# Patient Record
Sex: MALE | Race: WHITE | NOT HISPANIC OR LATINO
[De-identification: names, ages, dates, MRNs, and addresses within clinical notes are randomized per-mention and may not be internally consistent; named-entity substitution may affect disease eponyms.]

---

## 2018-01-17 ENCOUNTER — TRANSCRIPTION ENCOUNTER (OUTPATIENT)
Age: 78
End: 2018-01-17

## 2018-01-17 ENCOUNTER — APPOINTMENT (OUTPATIENT)
Dept: DERMATOLOGY | Facility: CLINIC | Age: 78
End: 2018-01-17
Payer: MEDICARE

## 2018-01-17 VITALS — BODY MASS INDEX: 28.1 KG/M2 | WEIGHT: 212 LBS | HEIGHT: 73 IN

## 2018-01-17 DIAGNOSIS — H91.90 UNSPECIFIED HEARING LOSS, UNSPECIFIED EAR: ICD-10-CM

## 2018-01-17 PROCEDURE — 99213 OFFICE O/P EST LOW 20 MIN: CPT

## 2018-11-15 ENCOUNTER — RX RENEWAL (OUTPATIENT)
Age: 78
End: 2018-11-15

## 2018-11-15 DIAGNOSIS — B35.6 TINEA CRURIS: ICD-10-CM

## 2019-01-16 ENCOUNTER — APPOINTMENT (OUTPATIENT)
Dept: DERMATOLOGY | Facility: CLINIC | Age: 79
End: 2019-01-16
Payer: MEDICARE

## 2019-01-16 DIAGNOSIS — Z00.00 ENCOUNTER FOR GENERAL ADULT MEDICAL EXAMINATION W/OUT ABNORMAL FINDINGS: ICD-10-CM

## 2019-01-16 DIAGNOSIS — L43.9 LICHEN PLANUS, UNSPECIFIED: ICD-10-CM

## 2019-01-16 PROCEDURE — 99213 OFFICE O/P EST LOW 20 MIN: CPT

## 2019-01-16 RX ORDER — FLUOCINONIDE 0.5 MG/G
0.05 CREAM TOPICAL TWICE DAILY
Qty: 30 | Refills: 2 | Status: DISCONTINUED | COMMUNITY
Start: 2017-08-14 | End: 2019-01-16

## 2019-01-16 RX ORDER — KETOCONAZOLE 20 MG/G
2 CREAM TOPICAL TWICE DAILY
Qty: 1 | Refills: 1 | Status: DISCONTINUED | COMMUNITY
Start: 2018-11-15 | End: 2019-01-16

## 2019-01-16 NOTE — HISTORY OF PRESENT ILLNESS
[FreeTextEntry1] : Patient presents for skin examination. [de-identified] : Denies new, changing, bleeding or tender lesions on the skin over the past year.\par

## 2019-01-16 NOTE — PHYSICAL EXAM
[Alert] : alert [Oriented x 3] : ~L oriented x 3 [Well Nourished] : well nourished [Full Body Skin Exam Performed] : performed [Eyelids] : Eyelids [Ears] : Ears [Lips] : Lips [Neck] : Neck [Nails] : Nails [FreeTextEntry3] : A full skin exam was performed including the scalp, face, neck, chest, abdomen, back, buttocks, upper extremities and lower extremities.  The patient declined examination of the genitalia.  \par The exam did not reveal any evidence of skin cancer, showing only the following benign growths:\par Seborrheic keratoses.\par \par Violaceous papules and plaques of the left > right shin.

## 2019-01-16 NOTE — ASSESSMENT
[FreeTextEntry1] : A complete skin examination was performed.  There is no evidence of skin cancer.  We discussed the importance of photoprotection, including the use of hats, protective clothing and sunscreens with an SPF of at least 30.  Sun avoidance was also discussed.\par \par LP\par Continue lidex cream bid.\par AmLactin lotion daily.\par Discussed with patient and wife.

## 2020-01-22 ENCOUNTER — APPOINTMENT (OUTPATIENT)
Dept: DERMATOLOGY | Facility: CLINIC | Age: 80
End: 2020-01-22
Payer: MEDICARE

## 2020-01-22 VITALS — BODY MASS INDEX: 26.69 KG/M2 | WEIGHT: 208 LBS | HEIGHT: 74 IN

## 2020-01-22 DIAGNOSIS — L28.0 LICHEN SIMPLEX CHRONICUS: ICD-10-CM

## 2020-01-22 PROCEDURE — 99213 OFFICE O/P EST LOW 20 MIN: CPT

## 2020-01-22 NOTE — PHYSICAL EXAM
[Alert] : alert [Oriented x 3] : ~L oriented x 3 [Well Nourished] : well nourished [Full Body Skin Exam Performed] : performed [FreeTextEntry3] : A full skin exam was performed including the scalp, face (including lips, ears, nose and eyes), neck, chest, abdomen, back, buttocks, upper extremities and lower extremities.  The patient declined examination of the genitalia.  \par The exam revealed the following benign growths:\par Seborrheic keratoses.\par \par Lichenified patch, left shin, with minimal crusting.\par

## 2020-01-22 NOTE — HISTORY OF PRESENT ILLNESS
[FreeTextEntry1] : Patient presents for skin examination. [de-identified] : Notes itching patch of the left shin.  No tenderness, but occasionally scratching to bleeding.  Present for months.

## 2020-01-22 NOTE — ASSESSMENT
[FreeTextEntry1] : A complete skin examination was performed.  There is no evidence of skin cancer.  We discussed the importance of photoprotection, including the use of hats, protective clothing and sunscreens with an SPF of at least 30.  Sun avoidance was also discussed.  The ABCDE's of melanoma was discussed.  Regular skin exams recommended.\par \par LSC - education.\par Elocon cream bid.\par AmLactin lotion.

## 2020-12-09 RX ORDER — FLUOCINONIDE 0.5 MG/G
0.05 CREAM TOPICAL TWICE DAILY
Qty: 1 | Refills: 3 | Status: ACTIVE | COMMUNITY
Start: 2019-01-16 | End: 1900-01-01

## 2021-03-11 ENCOUNTER — EMERGENCY (EMERGENCY)
Facility: HOSPITAL | Age: 81
LOS: 0 days | Discharge: HOME | End: 2021-03-11
Attending: EMERGENCY MEDICINE
Payer: MEDICARE

## 2021-03-11 VITALS
SYSTOLIC BLOOD PRESSURE: 171 MMHG | RESPIRATION RATE: 16 BRPM | TEMPERATURE: 98 F | HEART RATE: 100 BPM | DIASTOLIC BLOOD PRESSURE: 86 MMHG | OXYGEN SATURATION: 95 %

## 2021-03-11 VITALS — WEIGHT: 214.95 LBS | HEIGHT: 73 IN

## 2021-03-11 DIAGNOSIS — I10 ESSENTIAL (PRIMARY) HYPERTENSION: ICD-10-CM

## 2021-03-11 DIAGNOSIS — N20.1 CALCULUS OF URETER: ICD-10-CM

## 2021-03-11 DIAGNOSIS — Z87.442 PERSONAL HISTORY OF URINARY CALCULI: ICD-10-CM

## 2021-03-11 DIAGNOSIS — R10.9 UNSPECIFIED ABDOMINAL PAIN: ICD-10-CM

## 2021-03-11 DIAGNOSIS — Z20.822 CONTACT WITH AND (SUSPECTED) EXPOSURE TO COVID-19: ICD-10-CM

## 2021-03-11 DIAGNOSIS — H91.90 UNSPECIFIED HEARING LOSS, UNSPECIFIED EAR: ICD-10-CM

## 2021-03-11 LAB
ALBUMIN SERPL ELPH-MCNC: 4.1 G/DL — SIGNIFICANT CHANGE UP (ref 3.3–5)
ALP SERPL-CCNC: 63 U/L — SIGNIFICANT CHANGE UP (ref 40–120)
ALT FLD-CCNC: 49 U/L — SIGNIFICANT CHANGE UP (ref 12–78)
ANION GAP SERPL CALC-SCNC: 8 MMOL/L — SIGNIFICANT CHANGE UP (ref 5–17)
APPEARANCE UR: ABNORMAL
APTT BLD: 30.7 SEC — SIGNIFICANT CHANGE UP (ref 27.5–35.5)
AST SERPL-CCNC: 30 U/L — SIGNIFICANT CHANGE UP (ref 15–37)
BASOPHILS # BLD AUTO: 0.05 K/UL — SIGNIFICANT CHANGE UP (ref 0–0.2)
BASOPHILS NFR BLD AUTO: 0.3 % — SIGNIFICANT CHANGE UP (ref 0–2)
BILIRUB SERPL-MCNC: 0.7 MG/DL — SIGNIFICANT CHANGE UP (ref 0.2–1.2)
BILIRUB UR-MCNC: NEGATIVE — SIGNIFICANT CHANGE UP
BUN SERPL-MCNC: 13 MG/DL — SIGNIFICANT CHANGE UP (ref 7–23)
CALCIUM SERPL-MCNC: 9.4 MG/DL — SIGNIFICANT CHANGE UP (ref 8.5–10.1)
CHLORIDE SERPL-SCNC: 102 MMOL/L — SIGNIFICANT CHANGE UP (ref 96–108)
CO2 SERPL-SCNC: 26 MMOL/L — SIGNIFICANT CHANGE UP (ref 22–31)
COLOR SPEC: YELLOW — SIGNIFICANT CHANGE UP
CREAT SERPL-MCNC: 1.35 MG/DL — HIGH (ref 0.5–1.3)
DIFF PNL FLD: ABNORMAL
EOSINOPHIL # BLD AUTO: 0.04 K/UL — SIGNIFICANT CHANGE UP (ref 0–0.5)
EOSINOPHIL NFR BLD AUTO: 0.3 % — SIGNIFICANT CHANGE UP (ref 0–6)
GLUCOSE SERPL-MCNC: 230 MG/DL — HIGH (ref 70–99)
GLUCOSE UR QL: 50 MG/DL
HCT VFR BLD CALC: 49 % — SIGNIFICANT CHANGE UP (ref 39–50)
HGB BLD-MCNC: 17.2 G/DL — HIGH (ref 13–17)
IMM GRANULOCYTES NFR BLD AUTO: 0.3 % — SIGNIFICANT CHANGE UP (ref 0–1.5)
INR BLD: 1.01 RATIO — SIGNIFICANT CHANGE UP (ref 0.88–1.16)
KETONES UR-MCNC: ABNORMAL
LEUKOCYTE ESTERASE UR-ACNC: NEGATIVE — SIGNIFICANT CHANGE UP
LIDOCAIN IGE QN: 59 U/L — LOW (ref 73–393)
LYMPHOCYTES # BLD AUTO: 1.38 K/UL — SIGNIFICANT CHANGE UP (ref 1–3.3)
LYMPHOCYTES # BLD AUTO: 9.3 % — LOW (ref 13–44)
MCHC RBC-ENTMCNC: 30.8 PG — SIGNIFICANT CHANGE UP (ref 27–34)
MCHC RBC-ENTMCNC: 35.1 GM/DL — SIGNIFICANT CHANGE UP (ref 32–36)
MCV RBC AUTO: 87.8 FL — SIGNIFICANT CHANGE UP (ref 80–100)
MONOCYTES # BLD AUTO: 0.89 K/UL — SIGNIFICANT CHANGE UP (ref 0–0.9)
MONOCYTES NFR BLD AUTO: 6 % — SIGNIFICANT CHANGE UP (ref 2–14)
NEUTROPHILS # BLD AUTO: 12.47 K/UL — HIGH (ref 1.8–7.4)
NEUTROPHILS NFR BLD AUTO: 83.8 % — HIGH (ref 43–77)
NITRITE UR-MCNC: NEGATIVE — SIGNIFICANT CHANGE UP
PH UR: 7 — SIGNIFICANT CHANGE UP (ref 5–8)
PLATELET # BLD AUTO: 215 K/UL — SIGNIFICANT CHANGE UP (ref 150–400)
POTASSIUM SERPL-MCNC: 4.2 MMOL/L — SIGNIFICANT CHANGE UP (ref 3.5–5.3)
POTASSIUM SERPL-SCNC: 4.2 MMOL/L — SIGNIFICANT CHANGE UP (ref 3.5–5.3)
PROT SERPL-MCNC: 7.6 GM/DL — SIGNIFICANT CHANGE UP (ref 6–8.3)
PROT UR-MCNC: 100 MG/DL
PROTHROM AB SERPL-ACNC: 11.8 SEC — SIGNIFICANT CHANGE UP (ref 10.6–13.6)
RBC # BLD: 5.58 M/UL — SIGNIFICANT CHANGE UP (ref 4.2–5.8)
RBC # FLD: 12.4 % — SIGNIFICANT CHANGE UP (ref 10.3–14.5)
SARS-COV-2 RNA SPEC QL NAA+PROBE: SIGNIFICANT CHANGE UP
SODIUM SERPL-SCNC: 136 MMOL/L — SIGNIFICANT CHANGE UP (ref 135–145)
SP GR SPEC: 1.01 — SIGNIFICANT CHANGE UP (ref 1.01–1.02)
UROBILINOGEN FLD QL: NEGATIVE MG/DL — SIGNIFICANT CHANGE UP
WBC # BLD: 14.88 K/UL — HIGH (ref 3.8–10.5)
WBC # FLD AUTO: 14.88 K/UL — HIGH (ref 3.8–10.5)

## 2021-03-11 PROCEDURE — 74176 CT ABD & PELVIS W/O CONTRAST: CPT | Mod: 26

## 2021-03-11 PROCEDURE — 85730 THROMBOPLASTIN TIME PARTIAL: CPT

## 2021-03-11 PROCEDURE — 96374 THER/PROPH/DIAG INJ IV PUSH: CPT

## 2021-03-11 PROCEDURE — 99284 EMERGENCY DEPT VISIT MOD MDM: CPT | Mod: 25

## 2021-03-11 PROCEDURE — U0005: CPT

## 2021-03-11 PROCEDURE — 96375 TX/PRO/DX INJ NEW DRUG ADDON: CPT

## 2021-03-11 PROCEDURE — 86900 BLOOD TYPING SEROLOGIC ABO: CPT

## 2021-03-11 PROCEDURE — 93005 ELECTROCARDIOGRAM TRACING: CPT

## 2021-03-11 PROCEDURE — 74176 CT ABD & PELVIS W/O CONTRAST: CPT

## 2021-03-11 PROCEDURE — 85025 COMPLETE CBC W/AUTO DIFF WBC: CPT

## 2021-03-11 PROCEDURE — 83690 ASSAY OF LIPASE: CPT

## 2021-03-11 PROCEDURE — U0003: CPT

## 2021-03-11 PROCEDURE — 81001 URINALYSIS AUTO W/SCOPE: CPT

## 2021-03-11 PROCEDURE — 86901 BLOOD TYPING SEROLOGIC RH(D): CPT

## 2021-03-11 PROCEDURE — 71045 X-RAY EXAM CHEST 1 VIEW: CPT | Mod: 26

## 2021-03-11 PROCEDURE — 93010 ELECTROCARDIOGRAM REPORT: CPT

## 2021-03-11 PROCEDURE — 71045 X-RAY EXAM CHEST 1 VIEW: CPT

## 2021-03-11 PROCEDURE — 86850 RBC ANTIBODY SCREEN: CPT

## 2021-03-11 PROCEDURE — 36415 COLL VENOUS BLD VENIPUNCTURE: CPT

## 2021-03-11 PROCEDURE — 85610 PROTHROMBIN TIME: CPT

## 2021-03-11 PROCEDURE — 99283 EMERGENCY DEPT VISIT LOW MDM: CPT

## 2021-03-11 PROCEDURE — 80053 COMPREHEN METABOLIC PANEL: CPT

## 2021-03-11 RX ORDER — TAMSULOSIN HYDROCHLORIDE 0.4 MG/1
1 CAPSULE ORAL
Qty: 14 | Refills: 0
Start: 2021-03-11 | End: 2021-03-24

## 2021-03-11 RX ORDER — KETOROLAC TROMETHAMINE 30 MG/ML
30 SYRINGE (ML) INJECTION ONCE
Refills: 0 | Status: DISCONTINUED | OUTPATIENT
Start: 2021-03-11 | End: 2021-03-11

## 2021-03-11 RX ORDER — SODIUM CHLORIDE 9 MG/ML
500 INJECTION INTRAMUSCULAR; INTRAVENOUS; SUBCUTANEOUS ONCE
Refills: 0 | Status: COMPLETED | OUTPATIENT
Start: 2021-03-11 | End: 2021-03-11

## 2021-03-11 RX ORDER — ONDANSETRON 8 MG/1
4 TABLET, FILM COATED ORAL ONCE
Refills: 0 | Status: COMPLETED | OUTPATIENT
Start: 2021-03-11 | End: 2021-03-11

## 2021-03-11 RX ORDER — MORPHINE SULFATE 50 MG/1
4 CAPSULE, EXTENDED RELEASE ORAL ONCE
Refills: 0 | Status: DISCONTINUED | OUTPATIENT
Start: 2021-03-11 | End: 2021-03-11

## 2021-03-11 RX ORDER — OXYCODONE HYDROCHLORIDE 5 MG/1
1 TABLET ORAL
Qty: 12 | Refills: 0
Start: 2021-03-11 | End: 2021-03-13

## 2021-03-11 RX ADMIN — SODIUM CHLORIDE 500 MILLILITER(S): 9 INJECTION INTRAMUSCULAR; INTRAVENOUS; SUBCUTANEOUS at 03:52

## 2021-03-11 RX ADMIN — Medication 30 MILLIGRAM(S): at 06:12

## 2021-03-11 RX ADMIN — SODIUM CHLORIDE 500 MILLILITER(S): 9 INJECTION INTRAMUSCULAR; INTRAVENOUS; SUBCUTANEOUS at 02:18

## 2021-03-11 RX ADMIN — MORPHINE SULFATE 4 MILLIGRAM(S): 50 CAPSULE, EXTENDED RELEASE ORAL at 02:18

## 2021-03-11 NOTE — ED PROVIDER NOTE - PATIENT PORTAL LINK FT
You can access the FollowMyHealth Patient Portal offered by [facility] by registering at the following website: [email or web address]. By joining [employer]’s FollowMyHealth portal, you will also be able to view your health information using other applications (apps) compatible with our system.

## 2021-03-11 NOTE — ED PROVIDER NOTE - NSFOLLOWUPINSTRUCTIONS_ED_ALL_ED_FT
return to er for fever, chills, worsening pain or inability to urinate  call Dr. Donovan office today for appointment  stay very well hydrated drink 6, 8 ounce glasses of water

## 2021-03-11 NOTE — ED PROVIDER NOTE - PHYSICAL EXAMINATION
Gen:  Well appearing in moderate distress  Head:  NC/AT  Cardiac: S1S2, RRR  Abd: Soft, non tender left cvat  Resp: No distress, CTA   Ext: no deformities  Skin: warm and dry as visualized

## 2021-03-11 NOTE — ED PROVIDER NOTE - PROGRESS NOTE DETAILS
pt feeling better, updated pt and spoke to wife on the phone [phone] cell [phone]. will call on call urology Dr. Callahan for obstructive stone  wife states last time he had kidney stones was 1980 B Senait FRANK spoke with urologist Dr. Hieu Callahan, updated pt wife. pt may go home and follow up in the office GÓMEZ Sapp DO

## 2021-03-11 NOTE — ED ADULT NURSE NOTE - OBJECTIVE STATEMENT
pt, hard of hearing with hx of kidney stones presents to the ED c/o L sided flank pain/back pain that started the day before. States pain radiated  Pt c/o 10/10 pain. Pt denies chest pain, SOB, dizziness, h/a, N/V/urinary difficulty. Wrote on piece of paper to communicate with pt. PIV inserted and medications given as per doctor order. All safety measures in place at this time.

## 2021-03-11 NOTE — ED ADULT TRIAGE NOTE - CHIEF COMPLAINT QUOTE
Pt presents to the ED with left sided flank pain beginning around 1930 last night. Denies urinary symptoms, hematuria. Endorses nausea, denies V/D. Endorses history of kidney stones. 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

## 2021-03-11 NOTE — ED PROVIDER NOTE - CARE PROVIDER_API CALL
Hieu Callahan)  Urology  [address], [address] [place] [address]  Phone: [phone]  Fax: [phone]  Follow Up Time:

## 2021-03-11 NOTE — ED PROVIDER NOTE - CLINICAL SUMMARY MEDICAL DECISION MAKING FREE TEXT BOX
pt with left flank pain with ho kidney stones, will get labs, ua, ivf, pain medication and ct r/o obstructive uropathy

## 2021-03-11 NOTE — ED PROVIDER NOTE - OBJECTIVE STATEMENT
79 yo male with ho kidney stones and htn (always high when I come to the hospital) and 90% hearing loss pw left flank pain, no fever, chills, n/v/d.

## 2021-03-12 PROBLEM — H91.90 UNSPECIFIED HEARING LOSS, UNSPECIFIED EAR: Chronic | Status: ACTIVE | Noted: 2021-03-11

## 2021-03-12 PROBLEM — I10 ESSENTIAL (PRIMARY) HYPERTENSION: Chronic | Status: ACTIVE | Noted: 2021-03-11

## 2021-03-12 PROBLEM — N20.0 CALCULUS OF KIDNEY: Chronic | Status: ACTIVE | Noted: 2021-03-11

## 2021-03-30 ENCOUNTER — APPOINTMENT (OUTPATIENT)
Dept: UROLOGY | Facility: CLINIC | Age: 81
End: 2021-03-30
Payer: MEDICARE

## 2021-03-30 VITALS
HEART RATE: 98 BPM | TEMPERATURE: 97.4 F | SYSTOLIC BLOOD PRESSURE: 212 MMHG | WEIGHT: 210 LBS | BODY MASS INDEX: 27.83 KG/M2 | OXYGEN SATURATION: 98 % | DIASTOLIC BLOOD PRESSURE: 98 MMHG | HEIGHT: 73 IN

## 2021-03-30 DIAGNOSIS — N20.1 CALCULUS OF URETER: ICD-10-CM

## 2021-03-30 PROCEDURE — 99203 OFFICE O/P NEW LOW 30 MIN: CPT

## 2021-03-30 PROCEDURE — 99072 ADDL SUPL MATRL&STAF TM PHE: CPT

## 2021-03-30 NOTE — PHYSICAL EXAM
[General Appearance - Well Developed] : well developed [General Appearance - Well Nourished] : well nourished [Normal Appearance] : normal appearance [Well Groomed] : well groomed [General Appearance - In No Acute Distress] : no acute distress [Edema] : no peripheral edema [Exaggerated Use Of Accessory Muscles For Inspiration] : no accessory muscle use [Respiration, Rhythm And Depth] : normal respiratory rhythm and effort [Abdomen Soft] : soft [Abdomen Tenderness] : non-tender [Costovertebral Angle Tenderness] : no ~M costovertebral angle tenderness [Urethral Meatus] : meatus normal [Urinary Bladder Findings] : the bladder was normal on palpation [Scrotum] : the scrotum was normal [Normal Station and Gait] : the gait and station were normal for the patient's age [] : no rash [No Focal Deficits] : no focal deficits [Oriented To Time, Place, And Person] : oriented to person, place, and time [Affect] : the affect was normal [Mood] : the mood was normal [Not Anxious] : not anxious [No Palpable Adenopathy] : no palpable adenopathy

## 2021-03-30 NOTE — ASSESSMENT
[FreeTextEntry1] : 81 yo M with recent  ER visit for LEFT ureteral stone, now asymptomatic. Suspect he passed stone. Will obtain RBUS and KUB to confirm.

## 2021-03-30 NOTE — HISTORY OF PRESENT ILLNESS
[FreeTextEntry1] : 80 year old M seen 03/30/2021  with complaint of ureteral stone. This began 3/11/2021 when the patient presented to  with severe LEFT flank pain. CT showed 6 mm LEFT ureteral stone, and he was discharged after pain was controlled. Pt reports pain did not return. Voiding well. No other complaints at this time. \par Pain was severe in severity. Pain medication mad symptoms better, nothing makes sx worse. \par It is associated with nothing.\par No hematuria, no dysuria, no frequency, no urgency, no hesitancy, no straining. No incontinence. \par No fevers, no chills, no nausea, no vomiting, no current flank pain. \par \par No family history contributory to kidney stones.

## 2021-03-31 ENCOUNTER — APPOINTMENT (OUTPATIENT)
Dept: DERMATOLOGY | Facility: CLINIC | Age: 81
End: 2021-03-31

## 2021-05-03 ENCOUNTER — APPOINTMENT (OUTPATIENT)
Dept: DERMATOLOGY | Facility: CLINIC | Age: 81
End: 2021-05-03
Payer: MEDICARE

## 2021-05-03 DIAGNOSIS — D18.01 HEMANGIOMA OF SKIN AND SUBCUTANEOUS TISSUE: ICD-10-CM

## 2021-05-03 DIAGNOSIS — L82.0 INFLAMED SEBORRHEIC KERATOSIS: ICD-10-CM

## 2021-05-03 DIAGNOSIS — L82.1 OTHER SEBORRHEIC KERATOSIS: ICD-10-CM

## 2021-05-03 PROCEDURE — 99213 OFFICE O/P EST LOW 20 MIN: CPT | Mod: 25

## 2021-05-03 PROCEDURE — 99072 ADDL SUPL MATRL&STAF TM PHE: CPT

## 2021-05-03 PROCEDURE — 17110 DESTRUCTION B9 LES UP TO 14: CPT

## 2021-05-03 RX ORDER — OXYCODONE 5 MG/1
5 TABLET ORAL
Qty: 12 | Refills: 0 | Status: COMPLETED | COMMUNITY
Start: 2021-03-11 | End: 2021-05-03

## 2021-05-03 RX ORDER — TAMSULOSIN HYDROCHLORIDE 0.4 MG/1
0.4 CAPSULE ORAL
Qty: 14 | Refills: 0 | Status: COMPLETED | COMMUNITY
Start: 2021-03-11 | End: 2021-05-03

## 2021-05-03 RX ORDER — MOMETASONE FUROATE 1 MG/G
0.1 CREAM TOPICAL TWICE DAILY
Qty: 1 | Refills: 3 | Status: COMPLETED | COMMUNITY
Start: 2020-01-22 | End: 2021-05-03

## 2021-05-03 NOTE — HISTORY OF PRESENT ILLNESS
[FreeTextEntry1] : Patient presents for skin examination. [de-identified] : Notes irritated lesion of the left malar region.  Itching, but no bleeding.

## 2021-05-03 NOTE — PHYSICAL EXAM
[Alert] : alert [Oriented x 3] : ~L oriented x 3 [Well Nourished] : well nourished [Full Body Skin Exam Performed] : performed [FreeTextEntry3] : A full skin exam was performed including the scalp, face, neck, chest, abdomen, back, buttocks, upper extremities and lower extremities.  The patient declined examination of the breasts and genitalia.  \par The exam did show the following benign growths:\par Seborrheic keratoses.\par Cherry angioma.\par \par Greasy crusted papule, of the left malar region.\par \par

## 2022-03-15 ENCOUNTER — APPOINTMENT (OUTPATIENT)
Dept: UROLOGY | Facility: CLINIC | Age: 82
End: 2022-03-15

## 2022-07-05 ENCOUNTER — APPOINTMENT (OUTPATIENT)
Dept: DERMATOLOGY | Facility: CLINIC | Age: 82
End: 2022-07-05

## 2023-08-18 ENCOUNTER — NON-APPOINTMENT (OUTPATIENT)
Age: 83
End: 2023-08-18

## 2023-08-22 ENCOUNTER — INPATIENT (INPATIENT)
Facility: HOSPITAL | Age: 83
LOS: 1 days | Discharge: HOME | DRG: 603 | End: 2023-08-24
Attending: HOSPITALIST | Admitting: INTERNAL MEDICINE
Payer: MEDICARE

## 2023-08-22 VITALS
SYSTOLIC BLOOD PRESSURE: 231 MMHG | RESPIRATION RATE: 18 BRPM | OXYGEN SATURATION: 97 % | DIASTOLIC BLOOD PRESSURE: 113 MMHG | HEART RATE: 111 BPM | TEMPERATURE: 99 F

## 2023-08-22 DIAGNOSIS — L03.116 CELLULITIS OF LEFT LOWER LIMB: ICD-10-CM

## 2023-08-22 LAB
ALBUMIN SERPL ELPH-MCNC: 3.7 G/DL — SIGNIFICANT CHANGE UP (ref 3.3–5)
ALP SERPL-CCNC: 66 U/L — SIGNIFICANT CHANGE UP (ref 40–120)
ALT FLD-CCNC: 37 U/L — SIGNIFICANT CHANGE UP (ref 12–78)
ANION GAP SERPL CALC-SCNC: 4 MMOL/L — LOW (ref 5–17)
APPEARANCE UR: CLEAR — SIGNIFICANT CHANGE UP
APTT BLD: 31.2 SEC — SIGNIFICANT CHANGE UP (ref 24.5–35.6)
AST SERPL-CCNC: 21 U/L — SIGNIFICANT CHANGE UP (ref 15–37)
BASOPHILS # BLD AUTO: 0.03 K/UL — SIGNIFICANT CHANGE UP (ref 0–0.2)
BASOPHILS NFR BLD AUTO: 0.3 % — SIGNIFICANT CHANGE UP (ref 0–2)
BILIRUB SERPL-MCNC: 0.8 MG/DL — SIGNIFICANT CHANGE UP (ref 0.2–1.2)
BILIRUB UR-MCNC: NEGATIVE — SIGNIFICANT CHANGE UP
BUN SERPL-MCNC: 11 MG/DL — SIGNIFICANT CHANGE UP (ref 7–23)
CALCIUM SERPL-MCNC: 9.7 MG/DL — SIGNIFICANT CHANGE UP (ref 8.5–10.1)
CHLORIDE SERPL-SCNC: 104 MMOL/L — SIGNIFICANT CHANGE UP (ref 96–108)
CO2 SERPL-SCNC: 30 MMOL/L — SIGNIFICANT CHANGE UP (ref 22–31)
COLOR SPEC: YELLOW — SIGNIFICANT CHANGE UP
CREAT SERPL-MCNC: 0.88 MG/DL — SIGNIFICANT CHANGE UP (ref 0.5–1.3)
DIFF PNL FLD: NEGATIVE — SIGNIFICANT CHANGE UP
EGFR: 85 ML/MIN/1.73M2 — SIGNIFICANT CHANGE UP
EOSINOPHIL # BLD AUTO: 0.1 K/UL — SIGNIFICANT CHANGE UP (ref 0–0.5)
EOSINOPHIL NFR BLD AUTO: 1.1 % — SIGNIFICANT CHANGE UP (ref 0–6)
GLUCOSE SERPL-MCNC: 129 MG/DL — HIGH (ref 70–99)
GLUCOSE UR QL: NEGATIVE — SIGNIFICANT CHANGE UP
HCT VFR BLD CALC: 46.4 % — SIGNIFICANT CHANGE UP (ref 39–50)
HGB BLD-MCNC: 16.2 G/DL — SIGNIFICANT CHANGE UP (ref 13–17)
IMM GRANULOCYTES NFR BLD AUTO: 0.3 % — SIGNIFICANT CHANGE UP (ref 0–0.9)
INR BLD: 0.99 RATIO — SIGNIFICANT CHANGE UP (ref 0.85–1.18)
KETONES UR-MCNC: ABNORMAL
LACTATE SERPL-SCNC: 1.3 MMOL/L — SIGNIFICANT CHANGE UP (ref 0.7–2)
LEUKOCYTE ESTERASE UR-ACNC: NEGATIVE — SIGNIFICANT CHANGE UP
LYMPHOCYTES # BLD AUTO: 1.56 K/UL — SIGNIFICANT CHANGE UP (ref 1–3.3)
LYMPHOCYTES # BLD AUTO: 16.9 % — SIGNIFICANT CHANGE UP (ref 13–44)
MCHC RBC-ENTMCNC: 31.2 PG — SIGNIFICANT CHANGE UP (ref 27–34)
MCHC RBC-ENTMCNC: 34.9 GM/DL — SIGNIFICANT CHANGE UP (ref 32–36)
MCV RBC AUTO: 89.4 FL — SIGNIFICANT CHANGE UP (ref 80–100)
MONOCYTES # BLD AUTO: 0.81 K/UL — SIGNIFICANT CHANGE UP (ref 0–0.9)
MONOCYTES NFR BLD AUTO: 8.8 % — SIGNIFICANT CHANGE UP (ref 2–14)
NEUTROPHILS # BLD AUTO: 6.68 K/UL — SIGNIFICANT CHANGE UP (ref 1.8–7.4)
NEUTROPHILS NFR BLD AUTO: 72.6 % — SIGNIFICANT CHANGE UP (ref 43–77)
NITRITE UR-MCNC: NEGATIVE — SIGNIFICANT CHANGE UP
PH UR: 8 — SIGNIFICANT CHANGE UP (ref 5–8)
PLATELET # BLD AUTO: 276 K/UL — SIGNIFICANT CHANGE UP (ref 150–400)
POTASSIUM SERPL-MCNC: 4.1 MMOL/L — SIGNIFICANT CHANGE UP (ref 3.5–5.3)
POTASSIUM SERPL-SCNC: 4.1 MMOL/L — SIGNIFICANT CHANGE UP (ref 3.5–5.3)
PROT SERPL-MCNC: 8.2 GM/DL — SIGNIFICANT CHANGE UP (ref 6–8.3)
PROT UR-MCNC: NEGATIVE — SIGNIFICANT CHANGE UP
PROTHROM AB SERPL-ACNC: 11.2 SEC — SIGNIFICANT CHANGE UP (ref 9.5–13)
RBC # BLD: 5.19 M/UL — SIGNIFICANT CHANGE UP (ref 4.2–5.8)
RBC # FLD: 12.3 % — SIGNIFICANT CHANGE UP (ref 10.3–14.5)
SODIUM SERPL-SCNC: 138 MMOL/L — SIGNIFICANT CHANGE UP (ref 135–145)
SP GR SPEC: 1.01 — SIGNIFICANT CHANGE UP (ref 1.01–1.02)
UROBILINOGEN FLD QL: NEGATIVE — SIGNIFICANT CHANGE UP
WBC # BLD: 9.21 K/UL — SIGNIFICANT CHANGE UP (ref 3.8–10.5)
WBC # FLD AUTO: 9.21 K/UL — SIGNIFICANT CHANGE UP (ref 3.8–10.5)

## 2023-08-22 PROCEDURE — 73630 X-RAY EXAM OF FOOT: CPT | Mod: 26,LT

## 2023-08-22 PROCEDURE — 99285 EMERGENCY DEPT VISIT HI MDM: CPT

## 2023-08-22 PROCEDURE — 71045 X-RAY EXAM CHEST 1 VIEW: CPT | Mod: 26

## 2023-08-22 PROCEDURE — 87086 URINE CULTURE/COLONY COUNT: CPT

## 2023-08-22 PROCEDURE — 83036 HEMOGLOBIN GLYCOSYLATED A1C: CPT

## 2023-08-22 PROCEDURE — 93010 ELECTROCARDIOGRAM REPORT: CPT

## 2023-08-22 PROCEDURE — 80048 BASIC METABOLIC PNL TOTAL CA: CPT

## 2023-08-22 PROCEDURE — 85025 COMPLETE CBC W/AUTO DIFF WBC: CPT

## 2023-08-22 PROCEDURE — 80202 ASSAY OF VANCOMYCIN: CPT

## 2023-08-22 PROCEDURE — 81003 URINALYSIS AUTO W/O SCOPE: CPT

## 2023-08-22 PROCEDURE — 36415 COLL VENOUS BLD VENIPUNCTURE: CPT

## 2023-08-22 PROCEDURE — 80061 LIPID PANEL: CPT

## 2023-08-22 RX ORDER — OMEGA-3 ACID ETHYL ESTERS 1 G
1 CAPSULE ORAL
Refills: 0 | DISCHARGE

## 2023-08-22 RX ORDER — VANCOMYCIN HCL 1 G
1000 VIAL (EA) INTRAVENOUS ONCE
Refills: 0 | Status: DISCONTINUED | OUTPATIENT
Start: 2023-08-22 | End: 2023-08-22

## 2023-08-22 RX ORDER — CEFTRIAXONE 500 MG/1
1000 INJECTION, POWDER, FOR SOLUTION INTRAMUSCULAR; INTRAVENOUS ONCE
Refills: 0 | Status: DISCONTINUED | OUTPATIENT
Start: 2023-08-22 | End: 2023-08-22

## 2023-08-22 RX ORDER — ALPRAZOLAM 0.25 MG
0.25 TABLET ORAL ONCE
Refills: 0 | Status: DISCONTINUED | OUTPATIENT
Start: 2023-08-22 | End: 2023-08-22

## 2023-08-22 RX ORDER — MAGNESIUM OXIDE 400 MG ORAL TABLET 241.3 MG
1 TABLET ORAL
Refills: 0 | DISCHARGE

## 2023-08-22 RX ORDER — VANCOMYCIN HCL 1 G
1250 VIAL (EA) INTRAVENOUS ONCE
Refills: 0 | Status: COMPLETED | OUTPATIENT
Start: 2023-08-22 | End: 2023-08-22

## 2023-08-22 RX ORDER — SODIUM CHLORIDE 9 MG/ML
500 INJECTION INTRAMUSCULAR; INTRAVENOUS; SUBCUTANEOUS ONCE
Refills: 0 | Status: COMPLETED | OUTPATIENT
Start: 2023-08-22 | End: 2023-08-22

## 2023-08-22 RX ORDER — CEFTRIAXONE 500 MG/1
1000 INJECTION, POWDER, FOR SOLUTION INTRAMUSCULAR; INTRAVENOUS ONCE
Refills: 0 | Status: COMPLETED | OUTPATIENT
Start: 2023-08-22 | End: 2023-08-22

## 2023-08-22 RX ORDER — HYDRALAZINE HCL 50 MG
10 TABLET ORAL ONCE
Refills: 0 | Status: COMPLETED | OUTPATIENT
Start: 2023-08-22 | End: 2023-08-22

## 2023-08-22 RX ORDER — CHOLECALCIFEROL (VITAMIN D3) 125 MCG
2 CAPSULE ORAL
Refills: 0 | DISCHARGE

## 2023-08-22 RX ADMIN — CEFTRIAXONE 1000 MILLIGRAM(S): 500 INJECTION, POWDER, FOR SOLUTION INTRAMUSCULAR; INTRAVENOUS at 18:05

## 2023-08-22 RX ADMIN — Medication 10 MILLIGRAM(S): at 18:51

## 2023-08-22 RX ADMIN — SODIUM CHLORIDE 500 MILLILITER(S): 9 INJECTION INTRAMUSCULAR; INTRAVENOUS; SUBCUTANEOUS at 18:33

## 2023-08-22 RX ADMIN — Medication 0.25 MILLIGRAM(S): at 18:52

## 2023-08-22 RX ADMIN — Medication 166.67 MILLIGRAM(S): at 18:33

## 2023-08-22 NOTE — ED ADULT NURSE NOTE - NSFALLRISKINTERV_ED_ALL_ED
Assistance with ambulation/Communicate fall risk and risk factors to all staff, patient, and family/Provide visual cue: yellow wristband, yellow gown, etc/Reinforce activity limits and safety measures with patient and family/Call bell, personal items and telephone in reach/Instruct patient to call for assistance before getting out of bed/chair/stretcher/Non-slip footwear applied when patient is off stretcher/[place] to call system/Physically safe environment - no spills, clutter or unnecessary equipment/Purposeful Proactive Rounding/Room/bathroom lighting operational, light cord in reach

## 2023-08-22 NOTE — H&P ADULT - SKIN COMMENTS
+marked erythema dorsal L foot w some soft tissue swelling +marked erythema dorsal L foot w some soft tissue swelling, no streaking, skin intact

## 2023-08-22 NOTE — ED ADULT NURSE NOTE - OBJECTIVE STATEMENT
pt c/o swelling and redness to top of  left foot. Pt reports cutting shrubs last wednesday and the foot was swollen the next day. Pt reports getting abx at urgent care last thursday. Foot is swollen, with purple and red discoloration. Capillary refill less than 2 seconds. Skin warm to touch. Pt denies n/v/d/ chills, fever, pain.

## 2023-08-22 NOTE — H&P ADULT - NSHPLABSRESULTS_GEN_ALL_CORE
Xray L foot  no FB or gas        EKG NSR w IRBBB      CXR no acute disease, no significant change when I compared to CXR 2021

## 2023-08-22 NOTE — ED PROVIDER NOTE - CONSTITUTIONAL, MLM
Elderly WM. Well appearing, awake, alert, oriented to person, place, time/situation and in no apparent distress. normal... Elderly WM, awake, alert, oriented to person, place, time/situation and in no apparent distress.

## 2023-08-22 NOTE — ED STATDOCS - PROGRESS NOTE DETAILS
Lety Franklin for attending Dr. Lancaster:   82 y/o male w/PMHx of kidney stones, HTN, [place] presents to ED c/o worsening swelling to left foot. As per wife, pt was doing yard work with shrubs 5 days ago, noted redness on top of left foot with associated swelling and pain. Reports symptoms have been worsening. Was seen at urgent care 3 days ago and was started on Augmentin and was instructed to make a follow up appointment. Pt does not have PCP. Presents to ED for further evaluation of worsening symptoms. No fevers or chills. Pt with LLE cellulitis, questionable lymphangitis. Hypertensive to 200/100, tachycardic to 111, oral temp of 99F. Will send to main ED for further evaluation. Jeronimojolene Padilla PGY3: Agree with above rapid assessment. Additional Hx: Patient believes he has a possible insect bite on his foot 5 days ago while working in yard. No response to Augmentin from UC 3 days ago. Today with tachycardia, borderline temp, and localized cellulitis of RLE. tba for IV abx in setting out outpatient abx failure.

## 2023-08-22 NOTE — ED PROVIDER NOTE - OBJECTIVE STATEMENT
Pt is a 83y male w/ a PMH of anxiety, kidney stones, HTN, [place] presents to ED c/o worsening painful swelling to left foot. As per wife, pt was doing yard work with shrubs wearing flip flops on 8/16, noted redness on top of left foot with associated swelling and pain the day after.  Was seen at  8/19, was started on Augmentin w/ little relief of pain and ambulating, was instructed to make a f/u appointment. Wife told her PCP about 's condition, recommended ED visit. In triage, patient was hypertensive w/ 200/100, tachycardic w/ 111, oral temp of 99F. Denies fevers or chills. NKA. Pt is a 83y male w/ a PMH of anxiety, kidney stones, HTN, [place] presents ambulatory to ED c/o worsening painful swelling to left foot. As per wife, pt was doing yard work with shrubs wearing flip flops on 8/16, noted redness on top of left foot with associated swelling and pain the day after.  Was seen at  8/19, was started on po Augmentin w/ little relief of pain and ambulating, was instructed to make a f/u appointment. Wife told her PMD about 's condition, recommended ED eval. In triage, patient was hypertensive w/ 200/100, tachycardic w/ 111, oral temp of 99F. Denies fevers or chills. NKA. Pt is a 83y male w/ a PMH of anxiety, kidney stones, HTN, [place] presents ambulatory to ED c/o worsening painful swelling to left foot. As per wife, pt was doing yard work with shrubs wearing flip-flops on 8/16, noted the day after + redness on top of left foot with associated erythematous swelling and pain.  Was seen at [facility] 8/19 & started on po Augmentin w/ little symptomatic relief/ + worsening, was instructed to make a f/u appointment. Wife told her PMD about 's condition: recommended ED eval. In triage, patient was hypertensive w/ 200/100, tachycardic w/ 111, oral temp of 99F. Denies fevers or chills. NKA.

## 2023-08-22 NOTE — H&P ADULT - MUSCULOSKELETAL
OUTPATIENT CARDIOLOGY FOLLOW-UP    Name: Gumaro Godoy    Age: 79 y.o. Primary Care Physician: Saroj La MD    Date of Service: 7/9/2019    Chief Complaint:   Chief Complaint   Patient presents with    Follow-up     No cardiac complaints       Interim History:   Mrs. Katy Peter is a 69-year-old female with history of coronary artery disease status post PCI underwent 2012, hypertension, colon cancer status post partial colectomy and colostomy, hyperlipidemia, anxiety and mild dementia. She is here for follow-up visit. She was seen in the office on 10/30/2019, since her last visit, she has not had any ER visits or hospitalizations. She also has a history of several stents placed approximately 6 years back. She had an episode of TIA and was evaluated at [facility] about 1 year back. She is compliant with medications, as well as salt and fluid intake. He does not take any over-the-counter arthritis medications. No new cardiac complaints since last cardiology evaluation. She denies recent chest pain, SOB, palpitations, lightheadedness, dizziness, syncope, PND, or orthopnea. SR on EKG.     Review of Systems:   Cardiac: As per HPI  General: No fever, chills  Pulmonary: As per HPI  HEENT: No visual disturbances, difficult swallowing  GI: No nausea, vomiting  Endocrine: No thyroid disease or DM  Musculoskeletal: LUQUE x 4, no focal motor deficits  Skin: Intact, no rashes  Neuro/Psych: No headache or seizures    Past Medical History:  Past Medical History:   Diagnosis Date    Anxiety with depression     Arrhythmia     PAF    CAD (coronary artery disease)     Chronic kidney disease     stage 3    Chronic pain syndrome     Colon cancer ([facility] [address]     History of CVA (cerebrovascular accident) without residual deficits     Hyperlipidemia     Hypertension     Osteoarthritis     Seizures (HCC)     CHILDHOOD    Shingles     Sleep apnea     TIA (transient ischemic attack)        Past Surgical Prednisone     Sulfa Antibiotics Rash       Current Medications:  Current Outpatient Medications   Medication Sig Dispense Refill    donepezil (ARICEPT) 5 MG tablet Take 1 tablet by mouth daily (with breakfast) 90 tablet 3    torsemide (DEMADEX) 10 MG tablet 2 to 3 times weekly 30 tablet 5    HYDROcodone-acetaminophen (NORCO) 7.5-325 MG per tablet Take 1 tablet by mouth 2 times daily as needed for Pain for up to 30 days. 45 tablet 0    sertraline (ZOLOFT) 25 MG tablet 1/2 daily (Patient taking differently: Take 25 mg by mouth daily 1/2 daily) 60 tablet 2    pravastatin (PRAVACHOL) 40 MG tablet Take 1.5 tablets by mouth daily 90 tablet 2    pantoprazole (PROTONIX) 40 MG tablet Take 1 tablet by mouth daily 90 tablet 2    metoprolol succinate (TOPROL XL) 25 MG extended release tablet Take 2 tablets by mouth nightly TAKE 0.5 MG DAILY (Patient taking differently: Take 12.5 mg by mouth nightly TAKE 0.5 MG DAILY) 180 tablet 2    clopidogrel (PLAVIX) 75 MG tablet Take 1 tablet by mouth daily 90 tablet 2    vitamin D (CHOLECALCIFEROL) 1000 UNIT TABS tablet Take by mouth      Multiple Vitamins-Minerals (CENTRUM SILVER 50+MEN PO) Take by mouth      aspirin 325 MG tablet Take 325 mg by mouth daily      Cyanocobalamin (B-12) 2500 MCG TABS Take 1 tablet by mouth daily      cycloSPORINE (RESTASIS OP) Apply 1 drop to eye 2 times daily       No current facility-administered medications for this visit.         Physical Exam:  /76   Pulse 62   Resp 18   Ht 4' 8\" (1.422 m)   Wt 186 lb (84.4 kg)   BMI 41.70 kg/m²   Wt Readings from Last 3 Encounters:   07/09/19 186 lb (84.4 kg)   06/21/19 184 lb (83.5 kg)   06/10/19 192 lb (87.1 kg)     Appearance: Awake, alert and oriented x 3, no acute respiratory distress  Skin: Intact, no rash  Head: Normocephalic, atraumatic  Eyes: EOMI, no conjunctival erythema  ENMT: No pharyngeal erythema, MMM, no rhinorrhea  Neck: Supple, no elevated JVP, no carotid bruits  Lungs: Clear no joint swelling/no calf tenderness details…

## 2023-08-22 NOTE — H&P ADULT - HISTORY OF PRESENT ILLNESS
pt presents to ed c/o worsening swelling in L. foot  cutting shrubs on wednesday and thursday, noted redness on top of L. foot as well as swelling and pain.   on saturday noted to get worse, went to urgent care and started antibiotics.   swelling and redness has continued to spread, told to come to ED      In ED /113     RR 18    T 99.2    97% sat RA  hydralazine 10 mg IV      PAST MEDICAL HX  Hearing loss   HTN hypertension, unspecified type   Kidney stones.     PAST SURGICAL HX  No significant past surgical history.     FAMILY HX  No pertinent family history in first degree relatives.     83 year old male w no PMHx except hearing impairment presents to ED c/o worsening swelling in L. foot    cutting shrubs wearing flip flops on Wednesday and   Thursday, noted redness on top of L. foot as well as swelling and pain.   on Saturday symptoms worsened and he went to Urgent Care  started augmentin x 6 doses  swelling and redness has continued to spread, told to come to ED by wife's oncologist    denied fever or chills      In ED /113     RR 18    T 99.2    97% sat RA  hydralazine 10 mg IV  ceftriaxone 1000 mg  vanco 1000 mg   cc          PAST MEDICAL HX  Elevated BP in medical offices  Hearing loss   HTN hypertension, unspecified type   Kidney stones      PAST SURGICAL HX  No significant past surgical history       FAMILY HX  No pertinent family history in first degree relatives.      SOCIAL HX    nonsmoker  ambulates independently

## 2023-08-22 NOTE — ED PROVIDER NOTE - MUSCULOSKELETAL, MLM
MAEx4. L foot dorsum erythema, tactile warmth, mild swelling, no subcutaneous crepitus. Does not traverse past ankle. MAEx4. L foot dorsum erythema, tactile warmth, mild swelling, no subcutaneous crepitus. Erythematous swelling does not traverse past ankle.

## 2023-08-22 NOTE — ED ADULT TRIAGE NOTE - CHIEF COMPLAINT QUOTE
pt presents to ed c/o worsening swelling in L. foot. pt states he was cutting shrubs on wednesday and thursday, noted redness on top of L. foot as well as swelling and pain. on saturday noted to get worse, went to urgent care and started antibiotics. swelling and redness has continued to spread, told to come to ED

## 2023-08-22 NOTE — ED STATDOCS - ATTENDING CONTRIBUTION TO CARE
Dr. Agosto: I have personally performed a face to face bedside history and physical examination of this patient. I have discussed the history, examination, review of systems, assessment and plan of management with the resident. I have reviewed the electronic medical record and amended it to reflect my history, review of systems, physical exam, assessment and plan.

## 2023-08-22 NOTE — H&P ADULT - ASSESSMENT
Active 83 year old male w       #Cellulitis L foot  failed outpt treatment w oral antibiotics  unknown if or bitten but skin looks intact  1. admit to med  2. ceftriaxone 1000 mg q 24  3. vanco 1000 mg in ED  4. adjusted to vanco 1250 q 12  5. IV NS  6. ID consult  7. pepcid 20 mg IV q 12   8.. follow up official reading of xray      #Uncontrolled BP  no hx HTN  does have hx white coat BP elevation  1. s/p hydralazine in ED for marked BP elevation  2. monitor BP  3. follow up official CXR reading      #VTE  low risk      55 minutes   Active 83 year old male w       #Cellulitis L foot  failed outpt treatment w oral antibiotics  unknown if or bitten but skin looks intact  1. admit to med  2. ceftriaxone 1000 mg q 24  3. vanco 1000 mg in ED  4. adjusted to vanco 1250 q 12  5. IV NS  6. ID consult  7. pepcid 20 mg IV q 12 to cover if allergy/bite component  8.. follow up official reading of xray      #Uncontrolled BP  no hx HTN  does have hx white coat BP elevation  1. s/p hydralazine in ED for marked BP elevation  2. monitor BP  3. follow up official CXR reading      #VTE  low risk      55 minutes

## 2023-08-22 NOTE — ED ADULT NURSE NOTE - NSICDXPASTMEDICALHX_GEN_ALL_CORE_FT
PAST MEDICAL HISTORY:  Hearing loss     Hypertension, unspecified type     Kidney stones      O-T Plasty Text: The defect edges were debeveled with a #15 scalpel blade.  Given the location of the defect, shape of the defect and the proximity to free margins an O-T plasty was deemed most appropriate.  Using a sterile surgical marker, an appropriate O-T plasty was drawn incorporating the defect and placing the expected incisions within the relaxed skin tension lines where possible.    The area thus outlined was incised deep to adipose tissue with a #15 scalpel blade.  The skin margins were undermined to an appropriate distance in all directions utilizing iris scissors.

## 2023-08-22 NOTE — ED PROVIDER NOTE - CLINICAL SUMMARY MEDICAL DECISION MAKING FREE TEXT BOX
83y male chronic [place], does not f/u w/ doctors. BIB private car regarding L dorsal erythema, swelling, and pain x5 days, noted after gardening and clearing brush. Clinically worsening despite on PO Augmentin. VSS. Patient does not meet sepsis criteria, but warrants a w/u. Suspicion for failure of outpt treatment of L foot cellulitis.     Plan: EKG, CXR, XR left foot, labs including pan culture, lactate, coags. Cautious IV fluid, IV Vanco/Triazone. Monitor, observe, reassess.     Patient hypertensive. Over 200 systolic, anxious. Will administer PO xanax and IV hydralazine.     19:30 BP improved, labs notable for normal WBC/lac. Normal CMP. XRs wet read negative. 83y male chronic [place], does not f/u w/ doctors. BIB private car regarding L dorsal erythema, swelling, and pain x5 days, noted after gardening and clearing brush. Clinically worsening despite on PO Augmentin. VSS. Patient does not meet sepsis criteria, but warrants a w/u. Suspicion for failure of outpt treatment of L foot cellulitis.     Plan: EKG, CXR, XR left foot, labs including pan culture, lactate, coags. Cautious IV fluid, IV Vanco/Ceftriaxone. Monitor, observe, reassess.     Patient hypertensive. Over 200 systolic, anxious. Will administer PO xanax and IV hydralazine.     19:30 BP improved, labs notable for normal WBC/lac. Normal CMP. XRs wet read negative.    See STAT DOCs EMR for Med admission. 83y male chronic [place], does not f/u w/ doctors. BIB private car regarding L dorsal erythema, swelling, and pain x5 days, noted after gardening and clearing brush while wearing flip-flops. Clinical worsening despite on PO Augmentin. VSS. Patient does not meet sepsis criteria, but warrants a w/u. Suspicion for failure of outpt treatment of L foot cellulitis.     Plan: EKG, CXR, XR left foot, labs including pan culture, lactate, coags. Cautious IV fluid, IV Vanco/Ceftriaxone. Monitor, observe, reassess.     Patient hypertensive. Over 200 systolic, anxious. Will administer PO xanax and IV hydralazine.     19:30 BP improved, labs notable for normal WBC/lactate. Normal CMP. XRs wet read negative.    See STAT DOCs EMR for Med admission.

## 2023-08-22 NOTE — ED PROVIDER NOTE - NEUROLOGICAL, MLM
Alert and oriented, no focal deficits, no motor or sensory deficits. Chronic [place]. Alert and oriented, no focal deficits, no motor or sensory deficits. Chronic [place], otherwise CNs intact.  Normal speech

## 2023-08-22 NOTE — ED PROVIDER NOTE - CARDIAC, MLM
Tachycardic. Normal rate, regular rhythm. Tachycardic. Normal rate, regular rhythm.  Normal radial pulse.

## 2023-08-22 NOTE — H&P ADULT - NSHPPHYSICALEXAM_GEN_ALL_CORE
Vital Signs Last 24 Hrs  T(C): 36.7 (22 Aug 2023 22:07), Max: 37.3 (22 Aug 2023 16:23)  T(F): 98 (22 Aug 2023 22:07), Max: 99.2 (22 Aug 2023 16:23)  HR: 90 (22 Aug 2023 22:07) (90 - 111)  BP: 157/92 (22 Aug 2023 22:07) (157/92 - 231/113)  BP(mean): 113 (22 Aug 2023 18:55) (113 - 141)  RR: 15 (22 Aug 2023 22:07) (14 - 18)  SpO2: 98% (22 Aug 2023 22:07) (97% - 99%)    Parameters below as of 22 Aug 2023 22:07  Patient On (Oxygen Delivery Method): room air

## 2023-08-23 LAB
A1C WITH ESTIMATED AVERAGE GLUCOSE RESULT: 6.1 % — HIGH (ref 4–5.6)
ANION GAP SERPL CALC-SCNC: 3 MMOL/L — LOW (ref 5–17)
BASOPHILS # BLD AUTO: 0.04 K/UL — SIGNIFICANT CHANGE UP (ref 0–0.2)
BASOPHILS NFR BLD AUTO: 0.5 % — SIGNIFICANT CHANGE UP (ref 0–2)
BUN SERPL-MCNC: 10 MG/DL — SIGNIFICANT CHANGE UP (ref 7–23)
CALCIUM SERPL-MCNC: 8.9 MG/DL — SIGNIFICANT CHANGE UP (ref 8.5–10.1)
CHLORIDE SERPL-SCNC: 105 MMOL/L — SIGNIFICANT CHANGE UP (ref 96–108)
CHOLEST SERPL-MCNC: 130 MG/DL — SIGNIFICANT CHANGE UP
CO2 SERPL-SCNC: 26 MMOL/L — SIGNIFICANT CHANGE UP (ref 22–31)
CREAT SERPL-MCNC: 0.8 MG/DL — SIGNIFICANT CHANGE UP (ref 0.5–1.3)
EGFR: 87 ML/MIN/1.73M2 — SIGNIFICANT CHANGE UP
EOSINOPHIL # BLD AUTO: 0.17 K/UL — SIGNIFICANT CHANGE UP (ref 0–0.5)
EOSINOPHIL NFR BLD AUTO: 2 % — SIGNIFICANT CHANGE UP (ref 0–6)
ESTIMATED AVERAGE GLUCOSE: 128 MG/DL — HIGH (ref 68–114)
GLUCOSE SERPL-MCNC: 154 MG/DL — HIGH (ref 70–99)
HCT VFR BLD CALC: 43.3 % — SIGNIFICANT CHANGE UP (ref 39–50)
HDLC SERPL-MCNC: 45 MG/DL — SIGNIFICANT CHANGE UP
HGB BLD-MCNC: 15.1 G/DL — SIGNIFICANT CHANGE UP (ref 13–17)
IMM GRANULOCYTES NFR BLD AUTO: 0.4 % — SIGNIFICANT CHANGE UP (ref 0–0.9)
LIPID PNL WITH DIRECT LDL SERPL: 69 MG/DL — SIGNIFICANT CHANGE UP
LYMPHOCYTES # BLD AUTO: 1.4 K/UL — SIGNIFICANT CHANGE UP (ref 1–3.3)
LYMPHOCYTES # BLD AUTO: 16.7 % — SIGNIFICANT CHANGE UP (ref 13–44)
MCHC RBC-ENTMCNC: 31.5 PG — SIGNIFICANT CHANGE UP (ref 27–34)
MCHC RBC-ENTMCNC: 34.9 GM/DL — SIGNIFICANT CHANGE UP (ref 32–36)
MCV RBC AUTO: 90.2 FL — SIGNIFICANT CHANGE UP (ref 80–100)
MONOCYTES # BLD AUTO: 0.8 K/UL — SIGNIFICANT CHANGE UP (ref 0–0.9)
MONOCYTES NFR BLD AUTO: 9.5 % — SIGNIFICANT CHANGE UP (ref 2–14)
NEUTROPHILS # BLD AUTO: 5.94 K/UL — SIGNIFICANT CHANGE UP (ref 1.8–7.4)
NEUTROPHILS NFR BLD AUTO: 70.9 % — SIGNIFICANT CHANGE UP (ref 43–77)
NON HDL CHOLESTEROL: 86 MG/DL — SIGNIFICANT CHANGE UP
PLATELET # BLD AUTO: 237 K/UL — SIGNIFICANT CHANGE UP (ref 150–400)
POTASSIUM SERPL-MCNC: 4.1 MMOL/L — SIGNIFICANT CHANGE UP (ref 3.5–5.3)
POTASSIUM SERPL-SCNC: 4.1 MMOL/L — SIGNIFICANT CHANGE UP (ref 3.5–5.3)
RBC # BLD: 4.8 M/UL — SIGNIFICANT CHANGE UP (ref 4.2–5.8)
RBC # FLD: 12.5 % — SIGNIFICANT CHANGE UP (ref 10.3–14.5)
SODIUM SERPL-SCNC: 134 MMOL/L — LOW (ref 135–145)
TRIGL SERPL-MCNC: 88 MG/DL — SIGNIFICANT CHANGE UP
WBC # BLD: 8.38 K/UL — SIGNIFICANT CHANGE UP (ref 3.8–10.5)
WBC # FLD AUTO: 8.38 K/UL — SIGNIFICANT CHANGE UP (ref 3.8–10.5)

## 2023-08-23 PROCEDURE — 99222 1ST HOSP IP/OBS MODERATE 55: CPT

## 2023-08-23 RX ORDER — SODIUM CHLORIDE 9 MG/ML
1000 INJECTION, SOLUTION INTRAVENOUS
Refills: 0 | Status: COMPLETED | OUTPATIENT
Start: 2023-08-23 | End: 2023-08-23

## 2023-08-23 RX ORDER — VANCOMYCIN HCL 1 G
1250 VIAL (EA) INTRAVENOUS EVERY 12 HOURS
Refills: 0 | Status: DISCONTINUED | OUTPATIENT
Start: 2023-08-23 | End: 2023-08-24

## 2023-08-23 RX ORDER — LOSARTAN POTASSIUM 100 MG/1
25 TABLET, FILM COATED ORAL DAILY
Refills: 0 | Status: DISCONTINUED | OUTPATIENT
Start: 2023-08-23 | End: 2023-08-24

## 2023-08-23 RX ORDER — ACETAMINOPHEN 500 MG
650 TABLET ORAL EVERY 6 HOURS
Refills: 0 | Status: DISCONTINUED | OUTPATIENT
Start: 2023-08-22 | End: 2023-08-24

## 2023-08-23 RX ORDER — LANOLIN ALCOHOL/MO/W.PET/CERES
3 CREAM (GRAM) TOPICAL AT BEDTIME
Refills: 0 | Status: DISCONTINUED | OUTPATIENT
Start: 2023-08-22 | End: 2023-08-24

## 2023-08-23 RX ORDER — CEFTRIAXONE 500 MG/1
1000 INJECTION, POWDER, FOR SOLUTION INTRAMUSCULAR; INTRAVENOUS EVERY 24 HOURS
Refills: 0 | Status: DISCONTINUED | OUTPATIENT
Start: 2023-08-23 | End: 2023-08-24

## 2023-08-23 RX ORDER — FAMOTIDINE 10 MG/ML
20 INJECTION INTRAVENOUS
Refills: 0 | Status: DISCONTINUED | OUTPATIENT
Start: 2023-08-23 | End: 2023-08-23

## 2023-08-23 RX ORDER — FAMOTIDINE 10 MG/ML
20 INJECTION INTRAVENOUS
Refills: 0 | Status: DISCONTINUED | OUTPATIENT
Start: 2023-08-23 | End: 2023-08-24

## 2023-08-23 RX ORDER — HYDRALAZINE HCL 50 MG
10 TABLET ORAL EVERY 6 HOURS
Refills: 0 | Status: DISCONTINUED | OUTPATIENT
Start: 2023-08-23 | End: 2023-08-24

## 2023-08-23 RX ADMIN — Medication 2.5 MILLIGRAM(S): at 07:50

## 2023-08-23 RX ADMIN — FAMOTIDINE 20 MILLIGRAM(S): 10 INJECTION INTRAVENOUS at 11:00

## 2023-08-23 RX ADMIN — FAMOTIDINE 20 MILLIGRAM(S): 10 INJECTION INTRAVENOUS at 22:20

## 2023-08-23 RX ADMIN — Medication 10 MILLIGRAM(S): at 11:03

## 2023-08-23 RX ADMIN — SODIUM CHLORIDE 1000 MILLILITER(S): 9 INJECTION, SOLUTION INTRAVENOUS at 01:30

## 2023-08-23 RX ADMIN — Medication 166.67 MILLIGRAM(S): at 17:54

## 2023-08-23 RX ADMIN — CEFTRIAXONE 1000 MILLIGRAM(S): 500 INJECTION, POWDER, FOR SOLUTION INTRAMUSCULAR; INTRAVENOUS at 17:54

## 2023-08-23 RX ADMIN — LOSARTAN POTASSIUM 25 MILLIGRAM(S): 100 TABLET, FILM COATED ORAL at 16:22

## 2023-08-23 RX ADMIN — Medication 166.67 MILLIGRAM(S): at 05:54

## 2023-08-23 NOTE — CONSULT NOTE ADULT - SUBJECTIVE AND OBJECTIVE BOX
Patient is a 83y old  Male who presents with a chief complaint of cellulitis of foot  uncontrolled BP (22 Aug 2023 23:36)    HPI:  83 year old male w no PMHx except hearing impairment presents to ED c/o worsening swelling in L. foot cutting shrubs wearing flip flops on Wednesday and Thursday, noted redness on top of L. foot as well as swelling and pain. on Saturday symptoms worsened and he went to Urgent Care started augmentin x 6 doses swelling and redness has continued to spread, told to come to ED by wife's oncologist In ED /113     RR 18    T 99.2    97% sat RA hydralazine 10 mg IV ceftriaxone 1000 mg vanco 1000 mg  cc      PAST MEDICAL HX  Elevated BP in medical offices  Hearing loss   HTN hypertension, unspecified type   Kidney stones      PAST SURGICAL HX  No significant past surgical history       Meds: per reconciliation sheet, noted below  MEDICATIONS  (STANDING):  cefTRIAXone Injectable. 1000 milliGRAM(s) IV Push every 24 hours  famotidine Injectable 20 milliGRAM(s) IV Push two times a day  vancomycin  IVPB 1250 milliGRAM(s) IV Intermittent every 12 hours        Allergies    No Known Allergies    Intolerances      Social: no smoking, no alcohol, no illegal drugs; no recent travel, no exposure to TB  FAMILY HISTORY:  No pertinent family history in first degree relatives       no history of premature cardiovascular disease in first degree relatives    ROS: the patient denies fever, no chills, no HA, no dizziness, no sore throat, no blurry vision, no CP, no palpitations, no SOB, no cough, no abdominal pain, no diarrhea, no N/V, no dysuria, no leg pain, no claudication, no rash, no joint aches, no rectal pain or bleeding, no night sweats    All other systems reviewed and are negative    Vital Signs Last 24 Hrs  T(C): 36.6 (23 Aug 2023 08:21), Max: 37.3 (22 Aug 2023 16:23)  T(F): 97.9 (23 Aug 2023 08:21), Max: 99.2 (22 Aug 2023 16:23)  HR: 87 (23 Aug 2023 10:55) (85 - 111)  BP: 183/99 (23 Aug 2023 10:55) (157/92 - 231/113)  BP(mean): 113 (22 Aug 2023 18:55) (113 - 141)  RR: 20 (23 Aug 2023 08:21) (14 - 20)  SpO2: 97% (23 Aug 2023 08:21) (94% - 99%)    Parameters below as of 23 Aug 2023 08:21  Patient On (Oxygen Delivery Method): room air      Daily Height in cm: 185.42 (22 Aug 2023 16:25)    Daily Weight in k (23 Aug 2023 01:28)    PE:  Constitutional: NAD   HEENT: NC/AT, EOMI, PERRLA, conjunctivae clear; ears and nose atraumatic; pharynx benign  Neck: supple; thyroid not palpable  Back: no tenderness  Respiratory: respiratory effort normal; clear to auscultation  Cardiovascular: S1S2 regular, no murmurs  Abdomen: soft, not tender, not distended, positive BS; liver and spleen WNL  Genitourinary: no suprapubic tenderness  Lymphatic: no LN palpable  Musculoskeletal: no muscle tenderness, no joint swelling or tenderness  Extremities: no pedal edema L foot erythema warmth tenderness   Neurological/ Psychiatric: AxOx3, Judgement and insight normal;  moving all extremities  Skin: no rashes; no palpable lesions    Labs: all available labs reviewed                        15.1   8.38  )-----------( 237      ( 23 Aug 2023 06:56 )             43.3     08-23    134<L>  |  105  |  10  ----------------------------<  154<H>  4.1   |  26  |  0.80    Ca    8.9      23 Aug 2023 06:56    TPro  8.2  /  Alb  3.7  /  TBili  0.8  /  DBili  x   /  AST  21  /  ALT  37  /  AlkPhos  66  08-22     LIVER FUNCTIONS - ( 22 Aug 2023 17:59 )  Alb: 3.7 g/dL / Pro: 8.2 gm/dL / ALK PHOS: 66 U/L / ALT: 37 U/L / AST: 21 U/L / GGT: x           Urinalysis Basic - ( 23 Aug 2023 06:56 )    Color: x / Appearance: x / SG: x / pH: x  Gluc: 154 mg/dL / Ketone: x  / Bili: x / Urobili: x   Blood: x / Protein: x / Nitrite: x   Leuk Esterase: x / RBC: x / WBC x   Sq Epi: x / Non Sq Epi: x / Bacteria: x        Radiology: all available radiological tests reviewed    Advanced directives addressed: full resuscitation

## 2023-08-23 NOTE — CONSULT NOTE ADULT - ASSESSMENT
83 year old male w no PMHx except hearing impairment presents to ED c/o worsening swelling in L. foot cutting shrubs wearing flip flops on Wednesday and Thursday, noted redness on top of L. foot as well as swelling and pain. on Saturday symptoms worsened and he went to Urgent Care started augmentin x 6 doses swelling and redness has continued to spread, told to come to ED by wife's oncologist In ED /113     RR 18    T 99.2    97% sat RA hydralazine 10 mg IV ceftriaxone 1000 mg vanco 1000 mg  cc 83 year old male w no PMHx except hearing impairment presents to ED c/o worsening swelling in L. foot cutting shrubs wearing flip flops on Wednesday and Thursday, noted redness on top of L. foot as well as swelling and pain. on Saturday symptoms worsened and he went to Urgent Care started augmentin x 6 doses swelling and redness has continued to spread, told to come to ED by wife's oncologist In ED /113     RR 18    T 99.2    97% sat RA hydralazine 10 mg IV ceftriaxone 1000 mg vanco 1000 mg  cc     1. L foot cellulitis   - imaging reviewed  - agree with vancomyccin 1250mg q12h check trough prior to 4th dose  - on rocephin 1gm daily  - continue with abx coverage  - fu cultures  - on dc po doxycycline x 7 day course  - monitor temps  - tolerating abx well so far; no side effects noted  - reason for abx use and side effects reviewed with patient  - supportive care  - fu cbc    2. other issues - care per medicine

## 2023-08-24 ENCOUNTER — APPOINTMENT (OUTPATIENT)
Dept: INTERNAL MEDICINE | Facility: CLINIC | Age: 83
End: 2023-08-24

## 2023-08-24 ENCOUNTER — TRANSCRIPTION ENCOUNTER (OUTPATIENT)
Age: 83
End: 2023-08-24

## 2023-08-24 VITALS
TEMPERATURE: 98 F | RESPIRATION RATE: 19 BRPM | HEART RATE: 93 BPM | SYSTOLIC BLOOD PRESSURE: 139 MMHG | OXYGEN SATURATION: 96 % | DIASTOLIC BLOOD PRESSURE: 74 MMHG

## 2023-08-24 LAB
CULTURE RESULTS: NO GROWTH — SIGNIFICANT CHANGE UP
SPECIMEN SOURCE: SIGNIFICANT CHANGE UP
VANCOMYCIN TROUGH SERPL-MCNC: 10.3 UG/ML — SIGNIFICANT CHANGE UP (ref 10–20)

## 2023-08-24 PROCEDURE — 99239 HOSP IP/OBS DSCHRG MGMT >30: CPT

## 2023-08-24 RX ORDER — LOSARTAN POTASSIUM 100 MG/1
1 TABLET, FILM COATED ORAL
Qty: 30 | Refills: 0
Start: 2023-08-24 | End: 2023-09-22

## 2023-08-24 RX ADMIN — LOSARTAN POTASSIUM 25 MILLIGRAM(S): 100 TABLET, FILM COATED ORAL at 09:12

## 2023-08-24 RX ADMIN — Medication 166.67 MILLIGRAM(S): at 05:46

## 2023-08-24 RX ADMIN — FAMOTIDINE 20 MILLIGRAM(S): 10 INJECTION INTRAVENOUS at 09:12

## 2023-08-24 RX ADMIN — Medication 10 MILLIGRAM(S): at 05:47

## 2023-08-24 NOTE — DISCHARGE NOTE PROVIDER - NSDCMRMEDTOKEN_GEN_ALL_CORE_FT
cholecalciferol 25 mcg (1000 intl units) oral tablet: 2 tab(s) orally once a day  doxycycline monohydrate 100 mg oral tablet: 1 tab(s) orally 2 times a day  losartan 25 mg oral tablet: 1 tab(s) orally once a day  magnesium oxide 250 mg oral tablet: 1 tab(s) orally once a day  Multiple Vitamins oral tablet: 1 tab(s) orally once a day  Omega-500 oral capsule: 1 cap(s) orally once a day

## 2023-08-24 NOTE — PROGRESS NOTE ADULT - ASSESSMENT
83 year old male w no PMHx except hearing impairment presents to ED c/o worsening swelling in L. foot cutting shrubs wearing flip flops on Wednesday and Thursday, noted redness on top of L. foot as well as swelling and pain. on Saturday symptoms worsened and he went to Urgent Care started augmentin x 6 doses swelling and redness has continued to spread, told to come to ED by wife's oncologist In ED /113     RR 18    T 99.2    97% sat RA hydralazine 10 mg IV ceftriaxone 1000 mg vanco 1000 mg  cc     1. L foot cellulitis   - imaging reviewed  - on vancomyccin 1250mg q12h trough wnl #2-3  - on rocephin 1gm daily #3  - continue with abx coverage  - fu cultures - no growth  - on dc po doxycycline x 7 day course  - monitor temps  - tolerating abx well so far; no side effects noted  - reason for abx use and side effects reviewed with patient  - supportive care  - fu cbc    2. other issues - care per medicine

## 2023-08-24 NOTE — DISCHARGE NOTE PROVIDER - NSDCCPCAREPLAN_GEN_ALL_CORE_FT
PRINCIPAL DISCHARGE DIAGNOSIS  Diagnosis: Cellulitis of left foot  Assessment and Plan of Treatment: .You were admitted to the hospital due to lower extremity swelling and redness. You were found to have cellulitis which is a skin infection caused by bacteria. You received IV antibiotics during your hospital stay. Continue to take doxycycline antibiotics for 7more days. Continue to elevate your legs when seated to decrease swelling and pain. **Monitor for the following signs/symptoms: worsening lower extremity pain or swelling and pain in your legs, or temperature > 101. Contact your healthcare provider if you experience any of these signs/symptoms or return to the ED if your signs/symptoms are severe**

## 2023-08-24 NOTE — PROGRESS NOTE ADULT - SUBJECTIVE AND OBJECTIVE BOX
Reason for Admission: cellulitis of foot  uncontrolled BP  History of Present Illness:   83 year old male w no PMHx except hearing impairment presents to ED c/o worsening swelling in L. foot    cutting shrubs wearing flip flops on Wednesday and   Thursday, noted redness on top of L. foot as well as swelling and pain.   on Saturday symptoms worsened and he went to Urgent Care  started augmentin x 6 doses  swelling and redness has continued to spread, told to come to ED by wife's oncologist    denied fever or chills      In ED /113     RR 18    T 99.2    97% sat RA  hydralazine 10 mg IV  ceftriaxone 1000 mg  vanco 1000 mg   cc      8/23 - no overnight events. patient states pain and swelling improving    ROS:   All 10 systems reviewed and found to be negative with the exception of what has been described above.    Vital Signs Last 24 Hrs  T(C): 36.6 (23 Aug 2023 08:21), Max: 37.3 (22 Aug 2023 16:23)  T(F): 97.9 (23 Aug 2023 08:21), Max: 99.2 (22 Aug 2023 16:23)  HR: 87 (23 Aug 2023 10:55) (85 - 111)  BP: 183/99 (23 Aug 2023 10:55) (157/92 - 231/113)  BP(mean): 113 (22 Aug 2023 18:55) (113 - 141)  RR: 20 (23 Aug 2023 08:21) (14 - 20)  SpO2: 97% (23 Aug 2023 08:21) (94% - 99%)    Parameters below as of 23 Aug 2023 08:21  Patient On (Oxygen Delivery Method): room air    GEN: lying in bed, NAD  HEENT:   NC/AT, pupils equal and reactive, EOMI  CV:  +S1, +S2, RRR  RESP:   lungs clear to auscultation bilaterally, no wheeze, rales, rhonchi   BREAST:  not examined  GI:  abdomen soft, non-tender, non-distended, normoactive BS  RECTAL:  not examined  :  not examined  MSK:   normal muscle tone  EXT:  left foto dorsal surface erythema and edema   NEURO:  AAOX3, no focal neurological deficits           Labs and Results:                              15.1   8.38  )-----------( 237      ( 23 Aug 2023 06:56 )             43.3     08-23    134<L>  |  105  |  10  ----------------------------<  154<H>  4.1   |  26  |  0.80    Ca    8.9      23 Aug 2023 06:56    TPro  8.2  /  Alb  3.7  /  TBili  0.8  /  DBili  x   /  AST  21  /  ALT  37  /  AlkPhos  66  08-22        LIVER FUNCTIONS - ( 22 Aug 2023 17:59 )  Alb: 3.7 g/dL / Pro: 8.2 gm/dL / ALK PHOS: 66 U/L / ALT: 37 U/L / AST: 21 U/L / GGT: x           PT/INR - ( 22 Aug 2023 17:59 )   PT: 11.2 sec;   INR: 0.99 ratio         PTT - ( 22 Aug 2023 17:59 )  PTT:31.2 sec  Urinalysis Basic - ( 23 Aug 2023 06:56 )    Color: x / Appearance: x / SG: x / pH: x  Gluc: 154 mg/dL / Ketone: x  / Bili: x / Urobili: x   Blood: x / Protein: x / Nitrite: x   Leuk Esterase: x / RBC: x / WBC x   Sq Epi: x / Non Sq Epi: x / Bacteria: x        Lactate, Blood: 1.3 mmol/L (08-22 @ 17:59)        Labs, Radiology, Cardiology, and Other Results: Xray L foot  no FB or gas        EKG NSR w IRBBB        Active 83 year old male w       #Cellulitis L foot  - failed outpt treatment w oral antibiotics  - unknown if or bitten but skin looks intact  - continue vanco and ceftriaxone as per ID  - f/u cultures      #Uncontrolled BP  no hx HTN  does have hx white coat BP elevation  - start losartan 25 mg daily  - hydralazine prn  - cxr prelim neg    #VTE  low risk      dispo - likely in AM     
Date of service: 08-24-23 @ 10:48    pt seen and examined   L foot is better  less redness, swelling  no fever     ROS: no fever or chills; denies dizziness, no HA, no SOB or cough, no abdominal pain, no diarrhea or constipation; no dysuria, no urinary frequency    MEDICATIONS  (STANDING):  cefTRIAXone Injectable. 1000 milliGRAM(s) IV Push every 24 hours  famotidine Injectable 20 milliGRAM(s) IV Push two times a day  losartan 25 milliGRAM(s) Oral daily  vancomycin  IVPB 1250 milliGRAM(s) IV Intermittent every 12 hours    MEDICATIONS  (PRN):  acetaminophen     Tablet .. 650 milliGRAM(s) Oral every 6 hours PRN Temp greater or equal to 38C (100.4F), Mild Pain (1 - 3)  aluminum hydroxide/magnesium hydroxide/simethicone Suspension 30 milliLiter(s) Oral every 4 hours PRN Dyspepsia  hydrALAZINE Injectable 10 milliGRAM(s) IV Push every 6 hours PRN SBP > 160 mmHg  melatonin 3 milliGRAM(s) Oral at bedtime PRN Insomnia      Vital Signs Last 24 Hrs  T(C): 36.8 (24 Aug 2023 07:30), Max: 36.8 (24 Aug 2023 07:30)  T(F): 98.2 (24 Aug 2023 07:30), Max: 98.2 (24 Aug 2023 07:30)  HR: 93 (24 Aug 2023 07:30) (83 - 93)  BP: 139/74 (24 Aug 2023 07:30) (139/74 - 183/99)  BP(mean): --  RR: 19 (24 Aug 2023 07:30) (18 - 19)  SpO2: 96% (24 Aug 2023 07:30) (95% - 98%)    Parameters below as of 24 Aug 2023 07:30  Patient On (Oxygen Delivery Method): room air        PE:  Constitutional: NAD   HEENT: NC/AT, EOMI, PERRLA, conjunctivae clear; ears and nose atraumatic; pharynx benign  Neck: supple; thyroid not palpable  Back: no tenderness  Respiratory: respiratory effort normal; clear to auscultation  Cardiovascular: S1S2 regular, no murmurs  Abdomen: soft, not tender, not distended, positive BS; liver and spleen WNL  Genitourinary: no suprapubic tenderness  Lymphatic: no LN palpable  Musculoskeletal: no muscle tenderness, no joint swelling or tenderness  Extremities: no pedal edema L foot erythema warmth tenderness   Neurological/ Psychiatric: AxOx3, Judgement and insight normal;  moving all extremities  Skin: no rashes; no palpable lesions    Labs: all available labs reviewed                                   15.1   8.38  )-----------( 237      ( 23 Aug 2023 06:56 )             43.3     08-23    134<L>  |  105  |  10  ----------------------------<  154<H>  4.1   |  26  |  0.80    Ca    8.9      23 Aug 2023 06:56    TPro  8.2  /  Alb  3.7  /  TBili  0.8  /  DBili  x   /  AST  21  /  ALT  37  /  AlkPhos  66  08-22       Vancomycin Level, Trough: 10.3 ug/mL (08-24 @ 05:39)        Urinalysis Basic - ( 23 Aug 2023 06:56 )    Color: x / Appearance: x / SG: x / pH: x  Gluc: 154 mg/dL / Ketone: x  / Bili: x / Urobili: x   Blood: x / Protein: x / Nitrite: x   Leuk Esterase: x / RBC: x / WBC x   Sq Epi: x / Non Sq Epi: x / Bacteria: x    Culture - Urine (08.22.23 @ 20:06)   Specimen Source: Clean Catch None  Culture Results:   No growth  Culture - Blood (08.22.23 @ 17:59)   Specimen Source: .Blood None  Culture Results:   No growth at 24 hours  Culture - Blood (08.22.23 @ 17:59)   Specimen Source: .Blood None  Culture Results:   No growth at 24 hours    Radiology: all available radiological tests reviewed    Advanced directives addressed: full resuscitation

## 2023-08-24 NOTE — DISCHARGE NOTE PROVIDER - CARE PROVIDER_API CALL
Jonathan Shine  Internal Medicine  [address] [place] [address]  Phone: [phone]  Fax: [phone]  Follow Up Time:

## 2023-08-24 NOTE — DISCHARGE NOTE NURSING/CASE MANAGEMENT/SOCIAL WORK - NSDCPEFALRISK_GEN_ALL_CORE
For information on Fall & Injury Prevention, visit: [email or web address] OR  [email or web address] OR  https://www.cdc.gov/steadi/patient.html

## 2023-08-24 NOTE — DISCHARGE NOTE NURSING/CASE MANAGEMENT/SOCIAL WORK - PATIENT PORTAL LINK FT
You can access the FollowMyHealth Patient Portal offered by [facility] by registering at the following website: [email or web address]. By joining [employer]’s FollowMyHealth portal, you will also be able to view your health information using other applications (apps) compatible with our system.

## 2023-08-24 NOTE — DISCHARGE NOTE PROVIDER - HOSPITAL COURSE
Reason for Admission: cellulitis of foot  uncontrolled BP  History of Present Illness:   83 year old male w no PMHx except hearing impairment presents to ED c/o worsening swelling in L. foot  cutting shrubs wearing flip flops on Wednesday and   Thursday, noted redness on top of L. foot as well as swelling and pain.   on Saturday symptoms worsened and he went to Urgent Care  started augmentin x 6 doses  swelling and redness has continued to spread, told to come to ED by wife's oncologist    pt started on vanco and rocephin in the ED and continued as per ID recommendations.  pt now afebrile with significant improvement noted.  plan to dc on 7 more days of doxycycline as per ID recs.  he is aware to f/u with his PMD within 1 week to ensure resolution.    GEN: lying in bed, NAD  HEENT:   NC/AT, pupils equal and reactive, EOMI  CV:  +S1, +S2, RRR  RESP:   lungs clear to auscultation bilaterally, no wheeze, rales, rhonchi   BREAST:  not examined  GI:  abdomen soft, non-tender, non-distended, normoactive BS  RECTAL:  not examined  :  not examined  MSK:   normal muscle tone  EXT:  left foto dorsal surface erythema and edema   NEURO:  AAOX3, no focal neurological deficits    #Cellulitis L foot  - dc on po doxy for 7 more days  prelim cx negative    #Uncontrolled BP  - started on losartan 25 mg dialy  - outpt f/u for further titration if needed     dc home  time spent 40 mins

## 2023-08-28 LAB
CULTURE RESULTS: SIGNIFICANT CHANGE UP
CULTURE RESULTS: SIGNIFICANT CHANGE UP
SPECIMEN SOURCE: SIGNIFICANT CHANGE UP
SPECIMEN SOURCE: SIGNIFICANT CHANGE UP

## 2023-08-29 DIAGNOSIS — H91.90 UNSPECIFIED HEARING LOSS, UNSPECIFIED EAR: ICD-10-CM

## 2023-08-29 DIAGNOSIS — F41.9 ANXIETY DISORDER, UNSPECIFIED: ICD-10-CM

## 2023-08-29 DIAGNOSIS — L03.116 CELLULITIS OF LEFT LOWER LIMB: ICD-10-CM

## 2023-08-29 DIAGNOSIS — I10 ESSENTIAL (PRIMARY) HYPERTENSION: ICD-10-CM

## 2023-08-30 ENCOUNTER — APPOINTMENT (OUTPATIENT)
Dept: INTERNAL MEDICINE | Facility: CLINIC | Age: 83
End: 2023-08-30
Payer: MEDICARE

## 2023-08-30 ENCOUNTER — NON-APPOINTMENT (OUTPATIENT)
Age: 83
End: 2023-08-30

## 2023-08-30 VITALS
BODY MASS INDEX: 26.9 KG/M2 | SYSTOLIC BLOOD PRESSURE: 170 MMHG | WEIGHT: 203 LBS | DIASTOLIC BLOOD PRESSURE: 96 MMHG | HEIGHT: 73 IN

## 2023-08-30 PROCEDURE — 99214 OFFICE O/P EST MOD 30 MIN: CPT

## 2023-08-30 PROCEDURE — 99204 OFFICE O/P NEW MOD 45 MIN: CPT

## 2023-08-30 RX ORDER — DOXYCYCLINE HYCLATE 100 MG/1
100 CAPSULE ORAL
Qty: 14 | Refills: 0 | Status: ACTIVE | COMMUNITY
Start: 2023-08-30 | End: 1900-01-01

## 2023-08-30 NOTE — ASSESSMENT
[FreeTextEntry1] : Foot cellulitis–symptomatically it is much improved.  His wife also had pictures of what it look like previously and it is much improved from before.  There is some residual swelling and they were told there will take time for that to resolve. He will continue doxycycline 100 mg twice daily for another week and follow-up at that time to be rechecked.  They were told there is any significant worsening of his symptoms again he should return to the hospital.  He should try to keep the foot elevated is much as possible.  Can also use cool compresses as needed.  White coat syndrome with hypertension–he does wife both state that he has always had an anxiety issue when going to doctors.  His pressure has not been checked at home recently. He is going to continue Cozaar 25 mg for now. He will start Norvasc 5 mg daily as well. They were encouraged to monitor blood pressure at home. They will follow-up in 1 week.

## 2023-08-30 NOTE — PHYSICAL EXAM
[No Acute Distress] : no acute distress [No JVD] : no jugular venous distention [No Respiratory Distress] : no respiratory distress  [Clear to Auscultation] : lungs were clear to auscultation bilaterally [Normal Rate] : normal rate  [Regular Rhythm] : with a regular rhythm [Normal S1, S2] : normal S1 and S2 [No Murmur] : no murmur heard [No Edema] : there was no peripheral edema [Non Tender] : non-tender [No Joint Swelling] : no joint swelling [de-identified] : Dorsal aspect of left foot with localized swelling that is minimally tender.  There is no erythema but some discoloration.

## 2023-08-30 NOTE — HISTORY OF PRESENT ILLNESS
[FreeTextEntry8] : 83-year-old male presents to establish care and for follow-up of recent hospitalization left foot cellulitis.  He was hospitalized from 8/22 through 8/24.  Blood cultures were negative and he was discharged on doxycycline.  He has 1 day remaining.  The foot has improved significantly but there is still local swelling and tenderness.  Also while hospitalized his pressure was very elevated and he was started on Cozaar 25 mg daily. As no known past medical history other than an episode of dermatomyositis in 1986.  He was treated with prednisone and it resolved and never recurred. He has not seen a doctor or had any type of checkup in at least 25 years. The left foot does feel much better.  He denies any chest pain or shortness of breath, fever or chills.

## 2023-08-30 NOTE — REVIEW OF SYSTEMS
[Fever] : no fever [Chills] : no chills [Chest Pain] : no chest pain [Palpitations] : no palpitations [Lower Ext Edema] : no lower extremity edema [Shortness Of Breath] : no shortness of breath [Abdominal Pain] : no abdominal pain [Muscle Weakness] : no muscle weakness [Headache] : no headache [Dizziness] : no dizziness [de-identified] : Left foot swelling

## 2023-08-30 NOTE — HEALTH RISK ASSESSMENT
[No] : No [0] : 2) Feeling down, depressed, or hopeless: Not at all (0) [PHQ-2 Negative - No further assessment needed] : PHQ-2 Negative - No further assessment needed [[id number]] : 0 [Never] : Never

## 2023-09-05 ENCOUNTER — APPOINTMENT (OUTPATIENT)
Dept: INTERNAL MEDICINE | Facility: CLINIC | Age: 83
End: 2023-09-05
Payer: MEDICARE

## 2023-09-05 VITALS
DIASTOLIC BLOOD PRESSURE: 80 MMHG | WEIGHT: 202 LBS | BODY MASS INDEX: 26.77 KG/M2 | SYSTOLIC BLOOD PRESSURE: 146 MMHG | HEIGHT: 73 IN

## 2023-09-05 DIAGNOSIS — L03.116 CELLULITIS OF LEFT LOWER LIMB: ICD-10-CM

## 2023-09-05 PROCEDURE — 99214 OFFICE O/P EST MOD 30 MIN: CPT

## 2023-09-05 NOTE — REVIEW OF SYSTEMS
[Fever] : no fever [Chills] : no chills [Chest Pain] : no chest pain [Palpitations] : no palpitations [Shortness Of Breath] : no shortness of breath [Dizziness] : no dizziness [Headache] : no headache

## 2023-09-05 NOTE — ASSESSMENT
[FreeTextEntry1] : Left foot cellulitis–there is significant improvement.  He is asymptomatic at present.  He will complete the course of doxycycline over the next 2 days.  We will keep the area covered when wearing a shoe.  We will follow-up with his any change.  Hypertension–his blood pressure is improved but still somewhat elevated.  They are going to continue to monitor it at home over the next few weeks and follow-up for recheck as well as lab work. For now he will continue Norvasc 5 mg daily and Cozaar 25 mg daily.

## 2023-09-05 NOTE — HISTORY OF PRESENT ILLNESS
[de-identified] : Presents for follow-up of recent left foot cellulitis.  At his last visit on 8/30 there was much improvement over when he was first hospitalized.  He has remained on doxycycline 100 mg twice daily and has 2 days remaining.  The swelling and redness in the left foot is much improved.  He has no pain.  There is some dry skin on the surface. Also found to have high blood pressure in the hospital.  He was started on Cozaar 25 mg there and Norvasc 5 mg was added at his last visit.  They have been monitoring his blood pressure at home and the numbers have been much improved.  They are still somewhat elevated though.  Tolerating the medication without difficulty.

## 2023-09-05 NOTE — PHYSICAL EXAM
[No Acute Distress] : no acute distress [No Respiratory Distress] : no respiratory distress  [Normal Rate] : normal rate  [Regular Rhythm] : with a regular rhythm [No Edema] : there was no peripheral edema [de-identified] : The dorsum of the left foot with less swelling and no tenderness.  Some flaking of the skin only.

## 2023-10-03 ENCOUNTER — APPOINTMENT (OUTPATIENT)
Dept: INTERNAL MEDICINE | Facility: CLINIC | Age: 83
End: 2023-10-03
Payer: MEDICARE

## 2023-10-03 ENCOUNTER — NON-APPOINTMENT (OUTPATIENT)
Age: 83
End: 2023-10-03

## 2023-10-03 VITALS
WEIGHT: 199 LBS | SYSTOLIC BLOOD PRESSURE: 148 MMHG | DIASTOLIC BLOOD PRESSURE: 76 MMHG | HEIGHT: 73 IN | BODY MASS INDEX: 26.37 KG/M2

## 2023-10-03 DIAGNOSIS — Z00.00 ENCOUNTER FOR GENERAL ADULT MEDICAL EXAMINATION W/OUT ABNORMAL FINDINGS: ICD-10-CM

## 2023-10-03 PROCEDURE — G0438: CPT

## 2023-10-03 PROCEDURE — 36415 COLL VENOUS BLD VENIPUNCTURE: CPT

## 2023-10-04 LAB
ALBUMIN SERPL ELPH-MCNC: 4.8 G/DL
ALP BLD-CCNC: 59 U/L
ALT SERPL-CCNC: 26 U/L
ANION GAP SERPL CALC-SCNC: 12 MMOL/L
AST SERPL-CCNC: 27 U/L
BASOPHILS # BLD AUTO: 0.03 K/UL
BASOPHILS NFR BLD AUTO: 0.5 %
BILIRUB SERPL-MCNC: 1.2 MG/DL
BUN SERPL-MCNC: 14 MG/DL
CALCIUM SERPL-MCNC: 9.8 MG/DL
CHLORIDE SERPL-SCNC: 101 MMOL/L
CHOLEST SERPL-MCNC: 174 MG/DL
CO2 SERPL-SCNC: 25 MMOL/L
CREAT SERPL-MCNC: 0.89 MG/DL
EGFR: 84 ML/MIN/1.73M2
EOSINOPHIL # BLD AUTO: 0.12 K/UL
EOSINOPHIL NFR BLD AUTO: 2.1 %
GLUCOSE SERPL-MCNC: 113 MG/DL
HCT VFR BLD CALC: 45.1 %
HDLC SERPL-MCNC: 59 MG/DL
HGB BLD-MCNC: 15 G/DL
IMM GRANULOCYTES NFR BLD AUTO: 0.3 %
LDLC SERPL CALC-MCNC: 98 MG/DL
LYMPHOCYTES # BLD AUTO: 1.67 K/UL
LYMPHOCYTES NFR BLD AUTO: 29 %
MAN DIFF?: NORMAL
MCHC RBC-ENTMCNC: 30.8 PG
MCHC RBC-ENTMCNC: 33.3 GM/DL
MCV RBC AUTO: 92.6 FL
MONOCYTES # BLD AUTO: 0.54 K/UL
MONOCYTES NFR BLD AUTO: 9.4 %
NEUTROPHILS # BLD AUTO: 3.38 K/UL
NEUTROPHILS NFR BLD AUTO: 58.7 %
NONHDLC SERPL-MCNC: 115 MG/DL
PLATELET # BLD AUTO: 251 K/UL
POTASSIUM SERPL-SCNC: 4.6 MMOL/L
PROT SERPL-MCNC: 7.4 G/DL
RBC # BLD: 4.87 M/UL
RBC # FLD: 13.8 %
SODIUM SERPL-SCNC: 137 MMOL/L
TRIGL SERPL-MCNC: 93 MG/DL
WBC # FLD AUTO: 5.76 K/UL

## 2023-10-13 ENCOUNTER — TRANSCRIPTION ENCOUNTER (OUTPATIENT)
Age: 83
End: 2023-10-13

## 2023-12-28 ENCOUNTER — RX RENEWAL (OUTPATIENT)
Age: 83
End: 2023-12-28

## 2024-01-22 ENCOUNTER — APPOINTMENT (OUTPATIENT)
Dept: INTERNAL MEDICINE | Facility: CLINIC | Age: 84
End: 2024-01-22
Payer: MEDICARE

## 2024-01-22 VITALS
SYSTOLIC BLOOD PRESSURE: 148 MMHG | BODY MASS INDEX: 26.9 KG/M2 | HEIGHT: 73 IN | DIASTOLIC BLOOD PRESSURE: 92 MMHG | WEIGHT: 203 LBS

## 2024-01-22 DIAGNOSIS — I10 ESSENTIAL (PRIMARY) HYPERTENSION: ICD-10-CM

## 2024-01-22 PROCEDURE — 99213 OFFICE O/P EST LOW 20 MIN: CPT

## 2024-01-22 PROCEDURE — G2211 COMPLEX E/M VISIT ADD ON: CPT

## 2024-01-22 RX ORDER — LOSARTAN POTASSIUM 50 MG/1
50 TABLET, FILM COATED ORAL
Qty: 90 | Refills: 1 | Status: ACTIVE | COMMUNITY
Start: 2023-08-30 | End: 1900-01-01

## 2024-01-22 NOTE — HISTORY OF PRESENT ILLNESS
[de-identified] : Presents for follow-up and prescription renewal. Has a history of hypertension and has been on Norvasc and Cozaar. Has been monitoring his blood pressure at home and they have been well-controlled. Has been compliant with medication without any difficulties or side effects.

## 2024-01-22 NOTE — HEALTH RISK ASSESSMENT
[Yes] : Yes [2 - 4 times a month (2 pts)] : 2-4 times a month (2 points) [1 or 2 (0 pts)] : 1 or 2 (0 points) [Never (0 pts)] : Never (0 points) [No falls in past year] : Patient reported no falls in the past year [0] : 2) Feeling down, depressed, or hopeless: Not at all (0) [PHQ-2 Negative - No further assessment needed] : PHQ-2 Negative - No further assessment needed [Audit-CScore] : 2 [[id number]] : 0 [Never] : Never

## 2024-01-22 NOTE — REVIEW OF SYSTEMS
[Fever] : no fever [Chest Pain] : no chest pain [Palpitations] : no palpitations [Lower Ext Edema] : no lower extremity edema [Shortness Of Breath] : no shortness of breath [Dyspnea on Exertion] : not dyspnea on exertion [Abdominal Pain] : no abdominal pain [Dysuria] : no dysuria [Headache] : no headache [Dizziness] : no dizziness

## 2024-01-22 NOTE — PHYSICAL EXAM
[No JVD] : no jugular venous distention [No Respiratory Distress] : no respiratory distress  [Normal Rate] : normal rate  [Regular Rhythm] : with a regular rhythm [No Edema] : there was no peripheral edema

## 2024-01-22 NOTE — ASSESSMENT
[FreeTextEntry1] : Hypertension with whitecoat syndrome-his blood pressure has remained well-controlled and he has been monitoring it periodically at home. Continue Norvasc 5 mg daily and Cozaar 50 mg daily.

## 2024-03-12 RX ORDER — AMLODIPINE BESYLATE 5 MG/1
5 TABLET ORAL
Qty: 90 | Refills: 1 | Status: ACTIVE | COMMUNITY
Start: 2023-08-30 | End: 1900-01-01

## 2024-06-03 ENCOUNTER — APPOINTMENT (OUTPATIENT)
Dept: ORTHOPEDIC SURGERY | Facility: CLINIC | Age: 84
End: 2024-06-03
Payer: MEDICARE

## 2024-06-03 VITALS
BODY MASS INDEX: 23.49 KG/M2 | SYSTOLIC BLOOD PRESSURE: 169 MMHG | HEART RATE: 98 BPM | HEIGHT: 78 IN | DIASTOLIC BLOOD PRESSURE: 77 MMHG | WEIGHT: 203 LBS

## 2024-06-03 DIAGNOSIS — M70.42 PREPATELLAR BURSITIS, LEFT KNEE: ICD-10-CM

## 2024-06-03 DIAGNOSIS — Z78.9 OTHER SPECIFIED HEALTH STATUS: ICD-10-CM

## 2024-06-03 DIAGNOSIS — Z82.61 FAMILY HISTORY OF ARTHRITIS: ICD-10-CM

## 2024-06-03 DIAGNOSIS — Z86.79 PERSONAL HISTORY OF OTHER DISEASES OF THE CIRCULATORY SYSTEM: ICD-10-CM

## 2024-06-03 PROCEDURE — 73560 X-RAY EXAM OF KNEE 1 OR 2: CPT | Mod: LT

## 2024-06-03 PROCEDURE — 99203 OFFICE O/P NEW LOW 30 MIN: CPT

## 2024-06-04 PROBLEM — Z82.61 FAMILY HISTORY OF ARTHRITIS: Status: ACTIVE | Noted: 2024-06-04

## 2024-06-04 PROBLEM — Z86.79 HISTORY OF HYPERTENSION: Status: RESOLVED | Noted: 2024-06-04 | Resolved: 2024-06-04

## 2024-06-04 PROBLEM — Z78.9 CONSUMES ALCOHOL: Status: ACTIVE | Noted: 2024-06-04

## 2024-06-05 PROBLEM — M70.42 PREPATELLAR BURSITIS OF LEFT KNEE: Status: ACTIVE | Noted: 2024-06-03

## 2024-06-05 NOTE — HISTORY OF PRESENT ILLNESS
[de-identified] : The patient comes in today with complaints of pain in his left knee. He states he was kneeling on the ground on cement, and he developed pain and swelling in front of his left knee. He denies any fevers or chills. The patient states the onset/injury occurred on 5/30/2024. The patient states the pain is constant. The patient describes the pain as achy.  The patient notes ice make his symptoms better, while walking and bending make his symptoms worse. The patient indicates a pain level of 8 on a pain scale of 0-10.

## 2024-06-05 NOTE — PHYSICAL EXAM
[de-identified] : Right Knee:  Range of Motion in Degrees                                                      Claimant:              Normal:  Flexion Active:                               135                  135-degrees  Flexion Passive:                            135                  135-degrees  Extension Active:                           0-5                   0-5-degrees  Extension Passive:                        0-5                   0-5-degrees     No weakness to flexion/extension.  No evidence of instability in the AP plane or varus or valgus stress.  Negative Lachman.  Negative pivot shift.  Negative anterior drawer test.  Negative posterior drawer test.  Negative Walt.  Negative Apley grind.  No medial or lateral joint line tenderness.  Positive tenderness over the medial and lateral facet of the patella.  Positive patellofemoral crepitations.  No lateral tilting patella.  No patella apprehension.  Positive crepitation in the medial and lateral femoral condyle.  No proximal or distal swelling, edema or tenderness.  No gross motor or sensory deficits.  Mild intra-articular swelling.  2+ DP and PT pulses.  No varus or valgus malalignment.  Skin is intact.  No rashes, scars or lesions.   Left Knee:  Range of Motion in Degrees                                                      Claimant:              Normal:  Flexion Active:                               135                  135-degrees  Flexion Passive:                            135                  135-degrees  Extension Active:                           0-5                   0-5-degrees  Extension Passive:                        0-5                   0-5-degrees   Large prepatellar bursitis. No signs of infection. No weakness to flexion/extension.  Positive prepatellar swelling.  No evidence of instability in the AP plane or varus or valgus stress.  Negative Lachman.  Negative pivot shift.  Negative anterior drawer test.  Negative posterior drawer test.  Negative Walt.  Negative Apley grind.  No medial or lateral joint line tenderness.  No tenderness over the medial and lateral facet of the patella.  No patellofemoral crepitations.  No lateral tilting patella.  No patella apprehension.  No crepitation in the medial and lateral femoral condyle.  No proximal or distal swelling, edema or tenderness.  No gross motor or sensory deficits.  No intra-articular swelling.  2+ DP and PT pulses. No varus or valgus malalignment.  Skin is intact.  No rashes, scars or lesions.                   [de-identified] : Ambulating with a slightly antalgic to antalgic gait.  Station:  Normal.  [de-identified] : Appearance:  Well-developed, well-nourished male in no acute distress.   [de-identified] : Radiographs, which were taken in the office today, two views of the left knee, are essentially normal.

## 2024-06-05 NOTE — CONSULT LETTER
[Dear  ___] : Dear  [unfilled], [FreeTextEntry1] :  I had the pleasure of evaluating your patient, Malik Freeman.  Thank you very much for allowing me to participate in the care of this patient. Please see my note below.  If you have any questions, please do not hesitate to contact me.  Sincerely,   Malik Higginbotham III, MD [facility]/sg

## 2024-06-05 NOTE — DISCUSSION/SUMMARY
[de-identified] : At this time, due to prepatellar bursitis of the left knee, I recommended rest, ice, topical anti-inflammatory and compression sleeve. He will be reassessed in four weeks.

## 2024-06-05 NOTE — ADDENDUM
[FreeTextEntry1] : This note was written by Angy Rich on 06/05/2024 acting as a scribe for THEA WILKES III, MD

## 2024-07-08 ENCOUNTER — APPOINTMENT (OUTPATIENT)
Dept: ORTHOPEDIC SURGERY | Facility: CLINIC | Age: 84
End: 2024-07-08
Payer: MEDICARE

## 2024-07-08 VITALS
HEIGHT: 72 IN | BODY MASS INDEX: 27.5 KG/M2 | HEART RATE: 91 BPM | DIASTOLIC BLOOD PRESSURE: 83 MMHG | SYSTOLIC BLOOD PRESSURE: 171 MMHG | WEIGHT: 203 LBS

## 2024-07-08 DIAGNOSIS — M70.42 PREPATELLAR BURSITIS, LEFT KNEE: ICD-10-CM

## 2024-07-08 PROCEDURE — 99212 OFFICE O/P EST SF 10 MIN: CPT

## 2024-10-10 ENCOUNTER — APPOINTMENT (OUTPATIENT)
Dept: INTERNAL MEDICINE | Facility: CLINIC | Age: 84
End: 2024-10-10
Payer: MEDICARE

## 2024-10-10 VITALS
WEIGHT: 199 LBS | DIASTOLIC BLOOD PRESSURE: 82 MMHG | BODY MASS INDEX: 26.95 KG/M2 | SYSTOLIC BLOOD PRESSURE: 142 MMHG | HEIGHT: 72 IN

## 2024-10-10 DIAGNOSIS — R01.1 CARDIAC MURMUR, UNSPECIFIED: ICD-10-CM

## 2024-10-10 DIAGNOSIS — I10 ESSENTIAL (PRIMARY) HYPERTENSION: ICD-10-CM

## 2024-10-10 DIAGNOSIS — E78.5 HYPERLIPIDEMIA, UNSPECIFIED: ICD-10-CM

## 2024-10-10 PROCEDURE — 36415 COLL VENOUS BLD VENIPUNCTURE: CPT

## 2024-10-10 PROCEDURE — G0439: CPT

## 2024-10-11 LAB
ALBUMIN SERPL ELPH-MCNC: 4.9 G/DL
ALP BLD-CCNC: 71 U/L
ALT SERPL-CCNC: 24 U/L
ANION GAP SERPL CALC-SCNC: 12 MMOL/L
AST SERPL-CCNC: 20 U/L
BASOPHILS # BLD AUTO: 0.03 K/UL
BASOPHILS NFR BLD AUTO: 0.3 %
BILIRUB SERPL-MCNC: 1 MG/DL
BUN SERPL-MCNC: 13 MG/DL
CALCIUM SERPL-MCNC: 9.8 MG/DL
CHLORIDE SERPL-SCNC: 99 MMOL/L
CHOLEST SERPL-MCNC: 174 MG/DL
CO2 SERPL-SCNC: 24 MMOL/L
CREAT SERPL-MCNC: 0.86 MG/DL
EGFR: 85 ML/MIN/1.73M2
EOSINOPHIL # BLD AUTO: 0.11 K/UL
EOSINOPHIL NFR BLD AUTO: 1.3 %
GLUCOSE SERPL-MCNC: 117 MG/DL
HCT VFR BLD CALC: 47.6 %
HDLC SERPL-MCNC: 57 MG/DL
HGB BLD-MCNC: 15.9 G/DL
IMM GRANULOCYTES NFR BLD AUTO: 0.1 %
LDLC SERPL CALC-MCNC: 94 MG/DL
LYMPHOCYTES # BLD AUTO: 1.44 K/UL
LYMPHOCYTES NFR BLD AUTO: 16.5 %
MAN DIFF?: NORMAL
MCHC RBC-ENTMCNC: 30.8 PG
MCHC RBC-ENTMCNC: 33.4 GM/DL
MCV RBC AUTO: 92.1 FL
MONOCYTES # BLD AUTO: 0.58 K/UL
MONOCYTES NFR BLD AUTO: 6.6 %
NEUTROPHILS # BLD AUTO: 6.58 K/UL
NEUTROPHILS NFR BLD AUTO: 75.2 %
NONHDLC SERPL-MCNC: 116 MG/DL
PLATELET # BLD AUTO: 255 K/UL
POTASSIUM SERPL-SCNC: 4.8 MMOL/L
PROT SERPL-MCNC: 7.5 G/DL
RBC # BLD: 5.17 M/UL
RBC # FLD: 13.6 %
SODIUM SERPL-SCNC: 136 MMOL/L
TRIGL SERPL-MCNC: 124 MG/DL
WBC # FLD AUTO: 8.75 K/UL

## 2025-01-20 NOTE — ED ADULT TRIAGE NOTE - INTERNATIONAL TRAVEL
1/20/2025      Ulices Shah .  [address]  [facility] [address]      Dear Colleague,    Thank you for referring your patient, Ulices Shah Jr., to the [facility] [place]. Please see a copy of my visit note below.    [facility]  Neurosurgery Followup:    HPI: 86M with a syncopal episode 10/17/2024. He presented to the ED and was found to have a nondisplaced fracture of C5 osteophyte. He was placed in a cervical collar and transferred to [facility] for a cervical MRI and further recommendations. He was seen by NSGY at [facility] and was recommended to wear a cervical collar for 3 months. Today he presents for evaluation. He denies any neck pain, radicular arm pain, paresthesias or overt weakness. Has been wearing his cervical collar at all times as recommended. No new or worsened symptoms.     Presented for a follow up. Denies any symptoms. Did not obtain cervical CT as recommended. Continues to wear cervical collar.    Medical, surgical, family, and social history unchanged since prior exam.    Exam:  Constitutional:  Alert, well nourished, NAD.  HEENT: Normocephalic, atraumatic.   Pulm:  Without shortness of breath   CV:  No pitting edema of BLE.     Vital Signs:  /80   Pulse 72   Temp 97.4  F (36.3  C) (Temporal)   Resp 16   Wt 169 lb (76.7 kg)   SpO2 99%   BMI 25.70 kg/m      Neurological:  Awake  Alert  Oriented x 3  Motor exam:     Shoulder Abduction:  Right:  5/5    Left:  5/5  Biceps:                      Right:  5/5    Left:  5/5  Triceps:                     Right:  5/5    Left:  5/5  Wrist Extensors:       Right:  5/5    Left:  5/5  Wrist Flexors:           Right:  5/5    Left:  5/5  Intrinsics:                  Right:  5/5    Left:  5/5     Able to spontaneously move U/E bilaterally  Sensation intact throughout all U/E dermatomes      Imaging:  No new imaging to review.    A/P: cervical 5 fracture    Continue to wear cervical collar as  recommended. Obtain cervical CT as recommended. Our office will contact him with the results and further recommendations. He verbalized understanding and agreement.    Patient Instructions   -Cervical CT as previously recommended.  -Continue to wear your cervical collar for now.   -Our office will contact you with the results of the CT scan  -Please contact our clinic with questions or concerns at [phone].    Lauryn Mulligan CNP  [address] [address]  Tel [phone]  Fax [phone]      Again, thank you for allowing me to participate in the care of your patient.        Sincerely,        Lauryn Mulligan NP    Electronically signed No

## 2025-03-29 ENCOUNTER — RX RENEWAL (OUTPATIENT)
Age: 85
End: 2025-03-29

## 2025-04-21 ENCOUNTER — NON-APPOINTMENT (OUTPATIENT)
Age: 85
End: 2025-04-21

## 2025-04-23 ENCOUNTER — APPOINTMENT (OUTPATIENT)
Dept: INTERNAL MEDICINE | Facility: CLINIC | Age: 85
End: 2025-04-23
Payer: MEDICARE

## 2025-04-23 VITALS
SYSTOLIC BLOOD PRESSURE: 132 MMHG | WEIGHT: 202 LBS | DIASTOLIC BLOOD PRESSURE: 92 MMHG | BODY MASS INDEX: 27.36 KG/M2 | HEIGHT: 72 IN

## 2025-04-23 DIAGNOSIS — I10 ESSENTIAL (PRIMARY) HYPERTENSION: ICD-10-CM

## 2025-04-23 DIAGNOSIS — H91.90 UNSPECIFIED HEARING LOSS, UNSPECIFIED EAR: ICD-10-CM

## 2025-04-23 PROCEDURE — 99214 OFFICE O/P EST MOD 30 MIN: CPT

## 2025-04-23 PROCEDURE — G2211 COMPLEX E/M VISIT ADD ON: CPT
